# Patient Record
Sex: FEMALE | Race: WHITE | Employment: STUDENT | URBAN - METROPOLITAN AREA
[De-identification: names, ages, dates, MRNs, and addresses within clinical notes are randomized per-mention and may not be internally consistent; named-entity substitution may affect disease eponyms.]

---

## 2019-05-10 ENCOUNTER — HOSPITAL ENCOUNTER (OUTPATIENT)
Dept: PHYSICAL THERAPY | Age: 19
Setting detail: THERAPIES SERIES
Discharge: HOME | End: 2019-05-10
Payer: COMMERCIAL

## 2019-05-10 PROCEDURE — 97140 MANUAL THERAPY 1/> REGIONS: CPT

## 2019-05-10 PROCEDURE — 97110 THERAPEUTIC EXERCISES: CPT

## 2019-05-10 PROCEDURE — 97161 PT EVAL LOW COMPLEX 20 MIN: CPT

## 2019-05-10 NOTE — PLAN OF CARE
Orthopaedics and [address] [address] [facility] [address]  [place] [phone]  [place] [facility] [phone]     Physical Therapy Certification    Dear Referring Practitioner: Dr. Dariel Bañuelos,    We had the pleasure of evaluating the following patient for physical therapy services at [address] [place] [address]. A summary of our findings can be found in the initial assessment below. This includes our plan of care. If you have any questions or concerns regarding these findings, please do not hesitate to contact me at the office phone number checked above. Thank you for the referral.       Physician Signature:_______________________________Date:__________________  By signing above (or electronic signature), therapists plan is approved by physician      Patient: Ashley Serrato   : 2000   MRN: [id number]  Referring Physician: Referring Practitioner: Dr. Dariel Bañuelos      Evaluation Date: 5/10/2019      Medical Diagnosis Information:  Diagnosis: concussion, cervicogenic HA   Treatment Diagnosis: concussion S06.0X0A, cervicogenic HA G44.841, neck pain M54.2                                         Insurance information: PT Insurance Information: Aetna/AG/[place]    Precautions/ Contra-indications: none  Latex Allergy:  [x]NO      []YES  Preferred Language for Healthcare:   [x]English       []other:    SUBJECTIVE: Patient stated complaint: Patient reports that she was at practice last Friday and got hit from the L side and her neck was turned quickly. States that she has been having a HA since that time, no real neck pain, dizziness, light sensitivity, difficulty concentrating, loud noises. Most difficult/annoying is the HA. Notes HA wraps around entire posterior aspect of head.      Relevant Medical History:additional hx of concussion this past fall  Functional Disability Index:  NDI 28% disability, DHI 8% disability    Pain Scale: 8/10, typically has been at 6/10  Easing factors: not doing anything  Provocative factors: studying, sport     Type: [x]Constant   []Intermittent  []Radiating []Localized []other:     Numbness/Tingling: none    Occupation/School: student at [employer] Level of Function: Independent with ADLs and IADLs, plays field hockey    OBJECTIVE:     Special Test Results Symptoms and Time until Resolution of Symptoms   Dynamic Gait Index     Functional Gait Assessment     Motion Provoked Dizziness      Nel-Hallpike     Roll Test     Sidelying Test     Dizziness Handicap Inventory 8% disability    The Activities-Specific Balance Confidence Scale     BERTHA 2 errors level/ 14 errors uneven Increased dizziness                 VOMS Not Tested Headache 0-10 Dizziness 0-10 Nausea 0-10 Fogginess 0-10 Comments   Baseline Symptoms:         Smooth Pursuits  8.5/10 5/10 0/10 6/10 Mild catch in eyes with moving L   Saccades-Horizontal    8.5/10 0/10 0/10 6/10    Saccades-Vertical  8.5/10 0/10 0/10 6/10    Convergence (near Point)  9/10 6/10 0/10 6/10 Near Point in cm  Measure 1:20cm  Measure 2:20cm  Measure 3:   VOR-Horizontal         VOR-Vertical         Visual Motion Sensitivity                        CERV ROM     Cervical Flexion 55    Cervical Extension 60    Cervical SB 45 40   Cervical rotation Mild limitation Mild limitation- HA increase with R rotation        ROM Left Right   Shoulder Flex     Shoulder Abd     Shoulder ER     Shoulder IR                    Cervical Strength Right  Left   Flexion     Extension     Rotation     Lateral Flexion               Middle trap     Lower trap     Rhomboids             Special Test Right  Left   Spurlings     Sharp Pursor     VBI      Joint Mobility WNL Limited throughout entire L cervical spine   General Myotomes testing (listed deficits)                   Reflexes Normal Abnormal Comments         S1-2 Seated achilles [] []    S1-2 Prone knee bend [] []    L3-4 Patellar tendon [] []    C5-6 Biceps [] []    C6 Brachioradialis [] []    C7-8 Triceps [] []    Clonus [] []    Babinski [] []    Newsome's [] []      Reflexes/Sensation:    [x]Dermatomes/Myotomes intact    [x]Reflexes equal and normal bilaterally   []Other:    Joint mobility: limited L cervical mobility with sidegliding and upgliding from C4-C7   []Normal    [x]Hypo   []Hyper    Palpation: pain with palpation to posterior cervical musculature C4-7- no pain with suboccipitals, SCM or 1st rib    Functional Mobility/Transfers: limited in concentrating,reading/studing as well as participation in sport    Posture: WNL    Bandages/Dressings/Incisions: NAnone    Gait: (include devices/WB status): WNL     Orthopedic Special Tests: n/a                       [x] Patient history, allergies, meds reviewed. Medical chart reviewed. See intake form. Review Of Systems (ROS):  [x]Performed Review of systems (Integumentary, CardioPulmonary, Neurological) by intake and observation. Intake form has been scanned into medical record. Patient has been instructed to contact their primary care physician regarding ROS issues if not already being addressed at this time.       Co-morbidities/Complexities (which will affect course of rehabilitation):   [x]None           Arthritic conditions   []Rheumatoid arthritis (M05.9)  []Osteoarthritis (M19.91)   Cardiovascular conditions   []Hypertension (I10)  []Hyperlipidemia (E78.5)  []Angina pectoris (I20)  []Atherosclerosis (I70)  []CVA Musculoskeletal conditions   []Disc pathology   []Congenital spine pathologies   []Prior surgical intervention  []Osteoporosis (M81.8)  []Osteopenia (M85.8)   Endocrine conditions   []Hypothyroid (E03.9)  []Hyperthyroid Gastrointestinal conditions   []Constipation (S55.81)   Metabolic conditions   []Morbid obesity (E66.01)  []Diabetes type 1(E10.65) or 2 (E11.65)   []Neuropathy (G60.9)     Pulmonary conditions   []Asthma (J45)  []Coughing   []COPD (J44.9)   Psychological Disorders  []Anxiety RC/scapular/core strength and neuromuscular control    []Decreased UE functional strength   []other:      Functional Activity Limitations (from functional questionnaire and intake)   [x]Reduced ability to tolerate prolonged functional positions   []Reduced ability or difficulty with changes of positions or transfers between positions   []Reduced ability to maintain good posture and demonstrate good body mechanics with sitting, bending, and lifting   [x] Reduced ability or tolerance with driving and/or computer work   []Reduced ability to perform lifting, reaching, carrying tasks   [x]Reduced ability to concentrate   []Reduced ability to sleep    []Reduced ability to tolerate any impact through UE or spine   []Reduced ability to ambulate prolonged functional periods/distances   []other:    Participation Restrictions   []Reduced participation in self care activities   []Reduced participation in home management activities   [x]Reduced participation in work/school activities   []Reduced participation in social activities. [x]Reduced participation in sport/recreational activities.     Classification/Subgrouping:   []signs/symptoms consistent with neck pain with mobility deficits     [x]signs/symptoms consistent with neck pain with movement coordinated impairments    []signs/symptoms consistent with neck pain with radiating pain    [x]signs/symptoms consistent with neck pain with headaches (cervicogenic)    []Signs/symptoms consistent with nerve root involvement including myotome & dermatome dysfunction   []sign/symptoms consistent with facet dysfunction of cervical and thoracic spine    []signs/symptoms consistent suggesting central cord compression/UMN syndromes   []signs/symptoms consistent with discogenic cervical pain   []signs/symptoms consistent with rib dysfunction   []signs/symptoms consistent with postural dysfunction   []signs/symptoms consistent with shoulder pathology    []signs/symptoms consistent with post-surgical status including decreased ROM, strength and function. []signs/symptoms consistent with pathology which may benefit from Dry Needling   []signs/symptoms which may limit the use of advanced manual therapy techniques: (Elevated CV risk profile, recent trauma, intolerance to end range positions, prior TIA, visual issues, UE neurological compromise )     Prognosis/Rehab Potential:      []Excellent   [x]Good    []Fair   []Poor    Tolerance of evaluation/treatment:    []Excellent   [x]Good    []Fair   []Poor    Physical Therapy Evaluation Complexity Justification  [x] A history of present problem with:  [x] no personal factors and/or comorbidities that impact the plan of care;  []1-2 personal factors and/or comorbidities that impact the plan of care  []3 personal factors and/or comorbidities that impact the plan of care  [x] An examination of body systems using standardized tests and measures addressing any of the following: body structures and functions (impairments), activity limitations, and/or participation restrictions;:  [x] a total of 1-2 or more elements   [] a total of 3 or more elements   [] a total of 4 or more elements   [x] A clinical presentation with:  [x] stable and/or uncomplicated characteristics   [] evolving clinical presentation with changing characteristics  [] unstable and unpredictable characteristics;   [x] Clinical decision making of [x] low, [] moderate, [] high complexity using standardized patient assessment instrument and/or measurable assessment of functional outcome. [x] EVAL (LOW) 71139 (typically 20 minutes face-to-face)  [] EVAL (MOD) 32052 (typically 30 minutes face-to-face)  [] EVAL (HIGH) 82433 (typically 45 minutes face-to-face)  [] RE-EVAL     PLAN:   Frequency/Duration:  2 days per week for 1 Weeks:  Interventions:  [x]  Therapeutic exercise including: strength training, ROM, for cervical spine,scapula, core and Upper extremity, including postural re-education. [x]  NMR activation and proprioception for Deep cervical flexors, periscapular and RC muscles and Core, including postural re-education. [x]  Manual therapy as indicated for C/T spine, ribs, Soft tissue to include: Dry Needling/IASTM, STM, PROM, Gr I-IV mobilizations, manipulation. [x] Modalities as needed that may include: thermal agents, E-stim, Biofeedback, US, iontophoresis as indicated  [x] Patient education on joint protection, postural re-education, activity modification, progression of HEP. HEP instruction: oculomotor exercises and chin tuck (see scanned forms)    GOALS:  Patient stated goal: reduce HA    Therapist goals for Patient:   Short Term Goals: To be achieved in: 1 weeks  1. Independent in HEP and progression per patient tolerance, in order to prevent re-injury. 2. Patient will have a decrease in pain to facilitate improvement in movement, function, and ADLs as indicated by Functional Deficits. 3. Disability index score of 20% or less for the NDI to assist with reaching prior level of function. 4. Patient will report HA no greater than 4-5/10 with studying/concentrating.           Electronically signed by:  Elvia William

## 2019-05-10 NOTE — FLOWSHEET NOTE
[place] [address] [facility]  Phone: [phone] Fax: [phone]    Physical Therapy Daily Treatment Note  Date:  5/10/2019    Patient Name:  Lee Bains    :  2000  MRN: [id number]  Restrictions/Precautions:    Medical/Treatment Diagnosis Information:  · Diagnosis: concussion, cervicogenic HA  · Treatment Diagnosis: concussion S06.0X0A, cervicogenic HA G44.841, neck pain B41.1  Insurance/Certification information:  PT Insurance Information: Aetna/[place]  Physician Information:  Referring Practitioner: Dr. Mariee Cap of care signed (Y/N):     Date of Patient follow up with Physician:     G-Code (if applicable):      Date G-Code Applied: 5/10/2019     NDI 28% disability; [address] 8% disability    Progress Note: [x]  Yes  []  No  Next due by: Visit #10      Latex Allergy:  [x]NO      []YES  Preferred Language for Healthcare:   [x]English       []other:    Visit # Insurance Allowable   1 Aetna/[place]     Pain level:  8/10     SUBJECTIVE:  Patient reports that she was at practice last Friday and got hit from the L side and her neck was turned quickly. States that she has been having a HA since that time, no real neck pain, dizziness, light sensitivity, difficulty concentrating, loud noises. Most difficult/annoying is the HA.  Notes HA wraps around entire posterior aspect of head    OBJECTIVE: See eval  Observation:   Test measurements:      RESTRICTIONS/PRECAUTIONS: none    Exercises/Interventions:   INTERVENTIONS:    Exercise/Equipment Resistance/Repetitions Symptoms and duration for Resolution   Cervical Interventions: 20 min     Stretching            Upper trap            Levator                    Strengthening            Cervical retractions/chin tuck 1 x 15           Rows            Shoulder extensions            Flexion            Extension            Right Rotation            Left Rotation            Right Side Flexion            Left Side Flexion          Vestibular Interventions            Nel-Hallpike            Habituation          Double Leg Standing     Tandem Standing     Single Leg Standing     Biodex     Airex     Rocker Board     BOSU     Balance Beam     BERTHA 5min                   Oculomotor Interventions     Smooth Pursuits X 15    Gaze Stabilization X 15    Saccades-Horizontal X 15    Saccades-Vertical X 15    VOR-Hoirzontal     VOR-Vertical     Convergence     Visual Motion Sensitivity  (VMS)                 Therapeutic Ex Sets/sec Reps Notes   UBE      T- band Row/pinch      T- band lower pinch      T- band ER activation      UT stretch      Levator stretch      Isometric at wall      Quadruped w cerv retract      Front plank      Side plank      Chin tuck      Chin tuck w lift      Chin tuck w rotation                  Manual Intervention: 15 min      Cerv mobs/manip 1 5    Thoracic mobs/manip      CT manip 1 5    Rib mobilizations      STM 1 5    DN            NMR re-education      T-spine Ext- foam roll      Chin tucks       No money      Wall Postural re-ed                      Therapeutic Exercise and NMR EXR  [x] (92818) Provided verbal/tactile cueing for activities related to strengthening, flexibility, endurance, ROM  for improvements in cervical, postural, scapular, scapulothoracic and UE control with self care, reaching, carrying, lifting, house/yardwork, driving/computer work.    [] (45786) Provided verbal/tactile cueing for activities related to improving balance, coordination, kinesthetic sense, posture, motor skill, proprioception  to assist with cervical, scapular, scapulothoracic and UE control with self care, reaching, carrying, lifting, house/yardwork, driving/computer work.     Therapeutic Activities:    [] (75483 or 94802) Provided verbal/tactile cueing for activities related to improving balance, coordination, kinesthetic sense, posture, motor skill, proprioception and motor activation pain to facilitate improvement in movement, function, and ADLs as indicated by Functional Deficits. 3. Disability index score of 20% or less for the NDI to assist with reaching prior level of function. 4. Patient will report HA no greater than 4-5/10 with studying/concentrating. Progression Towards Functional goals:  [] Patient is progressing as expected towards functional goals listed. [] Progression is slowed due to complexities listed. [] Progression has been slowed due to co-morbidities. [x] Plan just implemented, too soon to assess goals progression  [] Other:     ASSESSMENT:  See saskia, mild improvement in HA at conclusion. Mild improvement in L cervical joint mobility. Cavitation of CT junction in seated.  Instructed all oculomotor exercises to include smooth pursuits, saccades and gaze stabilization    Treatment/Activity Tolerance:  [x] Patient tolerated treatment well [] Patient limited by fatique  [] Patient limited by pain  [] Patient limited by other medical complications  [] Other:     Prognosis: [x] Good [] Fair  [] Poor    Patient Requires Follow-up: [x] Yes  [] No    PLAN: See saskia  [] Continue per plan of care [] Alter current plan (see comments)  [x] Plan of care initiated [] Hold pending MD visit [] Discharge    Electronically signed by: Andressa Corral

## 2019-05-13 ENCOUNTER — HOSPITAL ENCOUNTER (OUTPATIENT)
Dept: PHYSICAL THERAPY | Age: 19
Setting detail: THERAPIES SERIES
Discharge: HOME | End: 2019-05-13
Payer: COMMERCIAL

## 2019-05-13 PROCEDURE — 97140 MANUAL THERAPY 1/> REGIONS: CPT

## 2019-05-13 PROCEDURE — 97110 THERAPEUTIC EXERCISES: CPT

## 2019-05-13 NOTE — FLOWSHEET NOTE
[facility] [address] [facility]  Phone: [phone] Fax: [phone]    Physical Therapy Daily Treatment Note  Date:  2019    Patient Name:  Radha Rosa    :  2000  MRN: [id number]  Restrictions/Precautions:    Medical/Treatment Diagnosis Information:  · Diagnosis: concussion, cervicogenic HA  · Treatment Diagnosis: concussion S06.0X0A, cervicogenic HA G44.841, neck pain U06.4  Insurance/Certification information:  PT Insurance Information: Aetna/[place]  Physician Information:  Referring Practitioner: Dr. Shade Sr of care signed (Y/N):     Date of Patient follow up with Physician:     G-Code (if applicable):      Date G-Code Applied: 5/10/2019     NDI 28% disability; [address] 8% disability    Progress Note: [x]  Yes  []  No  Next due by: Visit #10      Latex Allergy:  [x]NO      []YES  Preferred Language for Healthcare:   [x]English       []other:    Visit # Insurance Allowable   2 Aetna/AG/[place]     Pain level:  3/10     SUBJECTIVE:  Patient reports that she has been feeling quite a bit better since last Friday and throughout the entire weekend. Only at 3/10 HA today. Overall states she is doing much better. 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      OBJECTIVE:   Observation:   Test measurements:      RESTRICTIONS/PRECAUTIONS: none    Exercises/Interventions:   INTERVENTIONS:    Exercise/Equipment Resistance/Repetitions Symptoms and duration for Resolution   Cervical Interventions: 28 min     Stretching            Upper trap            Levator                    Strengthening            Cervical retractions/chin tuck 10sec x 10 22mmHg- cervical cuff   Quadruped chin tuck + retraction 10sec x 10           Rows            Shoulder extensions            Flexion            Extension            Right Rotation            Left Rotation            Right Side Flexion Left Side Flexion          Vestibular Interventions            Central-Hallpike            Habituation          Double Leg Standing     Tandem Standing     Single Leg Standing     Biodex 9 min; Level 9 , 3 trials each 1. Random Control- large [place]-med speed (99%, 89%, 83%)  2. Maze Control- (67%, 96%, 64%)  3.  Limits of Stability (33%, 50%, 46%)   Airex     Rocker Board     BOSU     Balance Beam     BERTHA                    Oculomotor Interventions     Smooth Pursuits X 20 each Horizontal, vertical and diagonal   Gaze Stabilization X 20 each horizontal & vertical   Saccades-Horizontal X 20 each    Saccades-Vertical X 20 each And diagonal   VOR-Hoirzontal     VOR-Vertical     Convergence     Visual Motion Sensitivity  (VMS)                 Therapeutic Ex Sets/sec Reps Notes   UBE      T- band Row/pinch      T- band lower pinch      T- band ER activation      UT stretch      Levator stretch      Isometric at wall      Quadruped w cerv retract      Front plank      Side plank      Chin tuck      Chin tuck w lift      Chin tuck w rotation                  Manual Intervention: 22 min      Cerv mobs/manip 1 10 Prone and supine   Thoracic mobs/manip      CT manip 1 5    Rib mobilizations 1 2 1st rib -L   STM 1 5    DN            NMR re-education      T-spine Ext- foam roll      Chin tucks       No money      Wall Postural re-ed                      Therapeutic Exercise and NMR EXR  [x] (67721) Provided verbal/tactile cueing for activities related to strengthening, flexibility, endurance, ROM  for improvements in cervical, postural, scapular, scapulothoracic and UE control with self care, reaching, carrying, lifting, house/yardwork, driving/computer work.    [] (11763) Provided verbal/tactile cueing for activities related to improving balance, coordination, kinesthetic sense, posture, motor skill, proprioception  to assist with cervical, scapular, scapulothoracic and UE control with self care, reaching, carrying, lifting, house/yardwork, driving/computer work. Therapeutic Activities:    [] (40880 or 67260) Provided verbal/tactile cueing for activities related to improving balance, coordination, kinesthetic sense, posture, motor skill, proprioception and motor activation to allow for proper function of cervical, scapular, scapulothoracic and UE control with self care, carrying, lifting, driving/computer work.      Home Exercise Program:    [x] (45653) Reviewed/Progressed HEP activities related to strengthening, flexibility, endurance, ROM of cervical, scapular, scapulothoracic and UE control with self care, reaching, carrying, lifting, house/yardwork, driving/computer work  [] (58834) Reviewed/Progressed HEP activities related to improving balance, coordination, kinesthetic sense, posture, motor skill, proprioception of cervical, scapular, scapulothoracic and UE control with self care, reaching, carrying, lifting, house/yardwork, driving/computer work      Manual Treatments:  PROM / STM / Oscillations-Mobs:  G-I, II, III, IV (PA's, Inf., Post.)  [x] (84827) Provided manual therapy to mobilize soft tissue/joints of cervical/CT, scapular GHJ and UE for the purpose of decreasing headache, modulating pain, promoting relaxation,  increasing ROM, reducing/eliminating soft tissue swelling/inflammation/restriction, improving soft tissue extensibility and allowing for proper ROM for normal function with self care, reaching, carrying, lifting, house/yardwork, driving/computer work    Modalities:      Charges:  Timed Code Treatment Minutes: 50   Total Treatment Minutes: 51     [] EVAL (LOW) 93791 (typically 20 minutes face-to-face)  [] EVAL (MOD) 95523 (typically 30 minutes face-to-face)  [] EVAL (HIGH) 55273 (typically 45 minutes face-to-face)  [] RE-EVAL     [x] UX(23496) x  2   [] IONTO  [] NMR (80261) x      [] VASO  [x] Manual (77370) x  1    [] Other:  [] TA x       [] Mech Traction (11241)  [] ES(attended) (88614)      [] ES (un) (07179):     GOALS:  Patient stated goal: reduce HA    Therapist goals for Patient:   Short Term Goals: To be achieved in: 1 weeks  1. Independent in HEP and progression per patient tolerance, in order to prevent re-injury. 2. Patient will have a decrease in pain to facilitate improvement in movement, function, and ADLs as indicated by Functional Deficits. 3. Disability index score of 20% or less for the NDI to assist with reaching prior level of function. 4. Patient will report HA no greater than 4-5/10 with studying/concentrating. Progression Towards Functional goals:  [x] Patient is progressing as expected towards functional goals listed. [] Progression is slowed due to complexities listed. [] Progression has been slowed due to co-morbidities. [] Plan just implemented, too soon to assess goals progression  [] Other:     ASSESSMENT:  Patient with less cervical joint limitation today. Restricted mainly at CT junction and L C5. Patient with improved joint restriction at conclusion of session- however no real reports of improvement in HA. Cavitation of CT junction in seated. Instructed all oculomotor exercises to include smooth pursuits, saccades and gaze stabilization- added diagonal exercises and Biodex. Will only be able to see patient 1 additional visit before she leaves to return home. Treatment/Activity Tolerance:  [x] Patient tolerated treatment well [] Patient limited by fatique  [] Patient limited by pain  [] Patient limited by other medical complications  [] Other:     Prognosis: [x] Good [] Fair  [] Poor    Patient Requires Follow-up: [x] Yes  [] No    PLAN: Continue with POC, progress as tolerated.    [x] Continue per plan of care [] Alter current plan (see comments)  [] Plan of care initiated [] Hold pending MD visit [] Discharge    Electronically signed by: Carl Baltazar

## 2019-05-15 ENCOUNTER — HOSPITAL ENCOUNTER (OUTPATIENT)
Dept: PHYSICAL THERAPY | Age: 19
Setting detail: THERAPIES SERIES
Discharge: HOME | End: 2019-05-15
Payer: COMMERCIAL

## 2019-05-15 PROCEDURE — 97110 THERAPEUTIC EXERCISES: CPT

## 2019-05-15 PROCEDURE — 97140 MANUAL THERAPY 1/> REGIONS: CPT

## 2019-05-15 NOTE — PLAN OF CARE
[place] [address] [address]  Phone: [phone] Fax: [phone]     Physical Therapy Discharge Summary    Dear Referring Practitioner: Dr. Ramirez Goodman,    We had the pleasure of treating the following patient for physical therapy services at [address] [place] [address]. A summary of our findings can be found in the discharge summary below. If you have any questions or concerns regarding these findings, please do not hesitate to contact me at the office phone number above. Thank you for the referral.     Physician Signature:________________________________Date:__________________  By signing above (or electronic signature), therapists plan is approved by physician      Overall Response to Treatment:   [x]Patient is responding well to treatment and improvement is noted with regards  to goals   []Patient should continue to improve in reasonable time if they continue HEP   []Patient has plateaued and is no longer responding to skilled PT intervention    []Patient is getting worse and would benefit from return to referring MD   []Patient unable to adhere to initial POC   [x]Other: Patient is leaving to return home to the [place] this week, therefore will d/c from PT services here. Is planning on continuing with PT services at home.     Date range of Visits:  thru 5/15/2019  Total Visits: 3    Physical Therapy Daily Treatment Note  Date:  5/15/2019    Patient Name:  Praful Rogers    :  2000  MRN: [id number]  Restrictions/Precautions:    Medical/Treatment Diagnosis Information:  · Diagnosis: concussion, cervicogenic HA  · Treatment Diagnosis: concussion S06.0X0A, cervicogenic HA G44.841, neck pain H76.2  Insurance/Certification information:  PT Insurance Information: Aetna/AG/[place]  Physician Information:  Referring Practitioner: Dr. Ugo Zavala of care signed (Y/N):     Date of Patient follow up with Physician:     G-Code (if applicable):      Date G-Code Applied: 5/15/2019     NDI 10% disability; [address] 2% disability    Progress Note: [x]  Yes  []  No  Next due by: Visit #10      Latex Allergy:  [x]NO      []YES  Preferred Language for Healthcare:   [x]English       []other:    Visit # Insurance Allowable   3 Aetna/AG/[place]     Pain level:  5/10     SUBJECTIVE:  Patient reports that she felt really good after last session- did not have any HA for the rest of the day and into the next. States that she woke up with more of a HA today than she has been having. Doing well with oculomotor exercises. Will be leaving for home this week. Will be continuing with PT services at home. Overall feels 70% improved since onset of therapy last week. OBJECTIVE:   Observation:   Test measurements:      RESTRICTIONS/PRECAUTIONS: none     Special Test Results Symptoms and Time until Resolution of Symptoms   Dizziness Handicap Inventory 2% disability     BERTHA 2 errors level/ 6 errors uneven Mild dizziness with EC         VOMS Not Tested Headache 0-10 Dizziness 0-10 Nausea 0-10 Fogginess 0-10 Comments   Baseline Symptoms:    4/10           Smooth Pursuits  4/10 0/10 0/10 0/10 Mild catch in eyes with moving L   Saccades-Horizontal      4/10 0/10 0/10 0/10     Saccades-Vertical   4/10 0/10 0/10 0/10     Convergence (near Point)   4/10 0/10 0/10 0/10 3cm        CERV ROM       Cervical Flexion 65     Cervical Extension 70     Cervical SB 55 55   Cervical rotation WNL WNL       Exercises/Interventions:   INTERVENTIONS:    Exercise/Equipment Resistance/Repetitions Symptoms and duration for Resolution   Cervical Interventions: 28 min     Strengthening            Cervical retractions/chin tuck 10sec x 10 22mmHg- cervical cuff   Quadruped chin tuck + retraction 10sec x 10    Double Leg Standing     Tandem Standing     Single Leg Standing     Biodex 9 min; Level 9 , 3 trials each 1. Random Control- large [place]-med speed (90%, 85%, 83%)  2. Maze Control- (75%, 82%, 84%)  3. Limits of Stability (35%, 56%, 48%)   Balance Beam     BERTHA 3 min    Oculomotor Interventions     Smooth Pursuits X 20 each Horizontal, vertical and diagonal   Gaze Stabilization X 20 each Standing red ball toss, walking with head turns x 2 reps each vertical and horizontal   Saccades-Horizontal     Saccades-Vertical       Manual Intervention: 22 min      Cerv mobs/manip 1 10 Prone and supine (AO mobilization   Thoracic mobs/manip 1 3 Prone t spine   CT manip 1 3 seated   Rib mobilizations np  1st rib -L   STM 1 5 Suboccipital release   DN          Therapeutic Exercise and NMR EXR  [x] (05641) Provided verbal/tactile cueing for activities related to strengthening, flexibility, endurance, ROM  for improvements in cervical, postural, scapular, scapulothoracic and UE control with self care, reaching, carrying, lifting, house/yardwork, driving/computer work.    [] (39512) Provided verbal/tactile cueing for activities related to improving balance, coordination, kinesthetic sense, posture, motor skill, proprioception  to assist with cervical, scapular, scapulothoracic and UE control with self care, reaching, carrying, lifting, house/yardwork, driving/computer work. Therapeutic Activities:    [] (75912 or 87547) Provided verbal/tactile cueing for activities related to improving balance, coordination, kinesthetic sense, posture, motor skill, proprioception and motor activation to allow for proper function of cervical, scapular, scapulothoracic and UE control with self care, carrying, lifting, driving/computer work.      Home Exercise Program:    [x] (26291) Reviewed/Progressed HEP activities related to strengthening, flexibility, endurance, ROM of cervical, scapular, scapulothoracic and UE control with self care, reaching, carrying, lifting, house/yardwork, driving/computer work  [] (61255) Reviewed/Progressed HEP activities related to improving balance, coordination, kinesthetic sense, been slowed due to co-morbidities. [] Plan just implemented, too soon to assess goals progression  [] Other:     ASSESSMENT:  Patient has been seen for 3 visits of physical therapy to address cervical neck pain and HA associated with concussion. Patient has made good improvements within 3 visits- HA has been reduced to average of 3/10; however today minimal increase with studying to 5/10 and improved back to 3/10 at conclusion of session with manual therapies. Patient NDI improved from 28% disability to 10% disability, DHI improved from 8% to 2% disability and BERTHA improved from 16 errors to 8 errors today. Patient still with mild limitation in oculomotor coordination during horizontal smooth pursuits. Did well with progression to standing/walking gaze stabilization and smooth pursuits today. Overall s/s have improved considerably. Less restriction along cervical spine today; however decreased AO mobility and CT junction mobility today. Improved with manual therapies. Reviewed HEP with patient. As patient to return home this week, will d/c from PT services at this time. Patient educated to continue with HEP and to follow up with PT services at home. Treatment/Activity Tolerance:  [x] Patient tolerated treatment well [] Patient limited by fatique  [] Patient limited by pain  [] Patient limited by other medical complications  [] Other:     Prognosis: [x] Good [] Fair  [] Poor    Patient Requires Follow-up: [x] Yes  [] No    PLAN: D/c from skilled PT services, educated to continue with HEP to prevent re-injury. Patient will need to continue with PT services at home for further progression.    [] Continue per plan of care [] Alter current plan (see comments)  [] Plan of care initiated [] Hold pending MD visit [x] Discharge    Electronically signed by: Bob Leon

## 2019-08-13 ENCOUNTER — NURSE ONLY (OUTPATIENT)
Dept: CARDIOLOGY CLINIC | Age: 19
End: 2019-08-13
Payer: COMMERCIAL

## 2019-08-13 DIAGNOSIS — Z02.5 SPORTS PHYSICAL: Primary | ICD-10-CM

## 2019-08-13 PROCEDURE — 93000 ELECTROCARDIOGRAM COMPLETE: CPT | Performed by: INTERNAL MEDICINE

## 2019-08-19 ENCOUNTER — OFFICE VISIT (OUTPATIENT)
Dept: PRIMARY CARE CLINIC | Age: 19
End: 2019-08-19
Payer: COMMERCIAL

## 2019-08-19 VITALS
HEART RATE: 64 BPM | OXYGEN SATURATION: 97 % | WEIGHT: 130.2 LBS | HEIGHT: 69 IN | SYSTOLIC BLOOD PRESSURE: 110 MMHG | DIASTOLIC BLOOD PRESSURE: 72 MMHG | BODY MASS INDEX: 19.28 KG/M2

## 2019-08-19 DIAGNOSIS — S06.0X0A CONCUSSION WITHOUT LOSS OF CONSCIOUSNESS, INITIAL ENCOUNTER: Primary | ICD-10-CM

## 2019-08-19 PROCEDURE — 99212 OFFICE O/P EST SF 10 MIN: CPT | Performed by: INTERNAL MEDICINE

## 2019-08-19 NOTE — PROGRESS NOTES
HISTORY OF PRESENT ILLNESS   Thais Mercado is a 22 y.o. right hand dominant female who presents for initial evaluation of a concussion suffered on 8/19/19. Pt reports she collided with another athlete's shoulder yesterday during warm ups. She practiced, then nausea worsened when tried to sleep. She reports a mild HA in practice. Her HA worsened when she stopped practicing. Appetite was slightly worse last night, but she slept well- went to bed earlier, slept through night, felt better than yesterday overall. Reports her HA is less intense, no dizziness, no nausea. Breakfast went OK. Mild light sensitivity, no phonophobia. Feels slow. HA worsens with looking at phone. The patient attends: [employer], grade 14  Sport:   Position: [place]   Other sports played: None  Previous concussion?: 3   Mechanism of injury: As above  Loss of consciousness: no. If yes, how long: NA   Helmet: no   Mouthpiece: yes   Initial treatment has included rest.   Current school attendance: NA.       RELEVANT MEDICAL HISTORY   ADHD: no   Seizure disorder: no   Learning disorder: no   Meningitis: no   Migraine or HA: no   Hx depression/anxiety/bipolar/schizophrenia: no   Family Hx migraines: no   FHx depression/anxiety/bipolar/schizophrenia: no         Current Symptoms include: see attached flowsheet        ]   ]   ]   ]   ]   ]   ]   ]   ]      PHYSICAL EXAMINATION   Constitutional: Alert, no distress, answers questions appropriately   Neuro: CN II - XII intact, strength 5/5 in bilateral UE, strength 5/5 in bilateral LE, moves all extremities equally. Symmetric reflexes bilateral UE and LE   Head: Normocephalic, no point tenderness to palpation. No sinus tenderness   Ears: Hearing grossly intact   Eyes: Extra ocular movements intact. Normal conjugate gaze, normal tracking, no nystagmus. Normal finger to nose. Discomfort with rapid eye movements, convergence.   Nasal: No tenderness, or bruising   Balance: Normal single leg stance with eyes open and closed. Negative Romberg. Negative pronator drift. Normal tandem gait with eyes open and closed   C-spine Exam: TTP along left paracervical mm  ROM Flexion: NL   ROM Extension: NL   ROM Lateral rotation (bilateral): NL   ROM Lateral Bend (bilateral): NL   Pain to palpation in periscapular region: negative   Shoulder shrug NL   Spurling's maneuver: Negative     Cognitive Testing Errors   Orientation score: 0/5. Immediate memory score: 0/3. Concentration score: 0/3. Delayed recall score: 0/3. Coordination score: 0/6. Which arm tested: B/L    Balance Testing   Modified Renata   Which foot was tested (non-dominant): B/L  Double Leg Stance (feet together): 0/10   Single leg stance (non-dominant foot): 0/10   Tandem stance (non-dominant foot at back): 2/10   Balance examination score (30-errors): 28/30     Renata Test: Date: 8/19/19  Total Errors Firm: 2     Neurocognitive Testing   Baseline neurocognitive testing performed: no   ImPACT Scores Date: 2018    IMAGING   None     ASSESSMENT/PLAN   - Refer for Impact testing. Vestibular Rehab. [facility] protocol for concussion. Avoid screens. Discussed the above with the patient:  - The nature of concussion injuries, risk factors, including the risks of repetitive head injury and the risk of long-term sequelae. - Red flags and concerns that would prompt immediate emergency evaluation.   - Appropriate school attendance, academic accommodations (if necessary) based on symptoms, and extra curricular activities. - The importance of physical and cognitive rest: No cell phones, Internet, video games, or driving.   - The role of medication: may use tylenol for HA if needed. Avoid alcohol, sleep aids, pain medications. - Avoid all activities that worsen symptoms.   - Report worsening symptoms.   - Reviewed role of further imaging and neurocognitive testing, may consider based on progress.    - Discussed role of close follow-up, milestones for progression.   - All patient's questions were answered.

## 2019-08-20 ENCOUNTER — HOSPITAL ENCOUNTER (OUTPATIENT)
Dept: PHYSICAL THERAPY | Age: 19
Setting detail: THERAPIES SERIES
Discharge: HOME | End: 2019-08-20
Payer: COMMERCIAL

## 2019-08-20 PROCEDURE — 97140 MANUAL THERAPY 1/> REGIONS: CPT

## 2019-08-20 PROCEDURE — 97161 PT EVAL LOW COMPLEX 20 MIN: CPT

## 2019-08-20 NOTE — FLOWSHEET NOTE
relaxation,  increasing ROM, reducing/eliminating soft tissue swelling/inflammation/restriction, improving soft tissue extensibility and allowing for proper ROM for normal function with self care, reaching, carrying, lifting, house/yardwork, driving/computer work    Modalities:      Charges:  Timed Code Treatment Minutes: 22   Total Treatment Minutes: 50     [x] EVAL (LOW) 30678 (typically 20 minutes face-to-face)  [] EVAL (MOD) 22972 (typically 30 minutes face-to-face)  [] EVAL (HIGH) 98899 (typically 45 minutes face-to-face)  [] RE-EVAL     [] NM(71807) x      [] IONTO  [] NMR (39945) x      [] VASO  [x] Manual (39595) x  1    [] Other:  [] TA x       [] Mech Traction (93517)  [] ES(attended) (72890)      [] ES (un) (43018):     GOALS:  Patient stated goal: get rid of HA  [] Progressing: [] Met: [] Not Met: [] Adjusted    Therapist goals for Patient:   Short Term Goals: To be achieved in: 2 weeks  1. Independent in HEP and progression per patient tolerance, in order to prevent re-injury. [] Progressing: [] Met: [] Not Met: [] Adjusted  2. Patient will have a decrease in pain to facilitate improvement in movement, function, and ADLs as indicated by Functional Deficits. [] Progressing: [] Met: [] Not Met: [] Adjusted    Long Term Goals: To be achieved in: 4 weeks  1. Disability index score of 10% or less for the NDI to assist with reaching prior level of function. [] Progressing: [] Met: [] Not Met: [] Adjusted  2. Patient will demonstrate increased AROM to [facility] of cervical/thoracic spine to allow for proper joint functioning as indicated by patients Functional Deficits. [] Progressing: [] Met: [] Not Met: [] Adjusted  3. Patient will demonstrate an increase in postural awareness and control and activation of  Deep cervical stabilizers to allow for proper functional mobility as indicated by patients Functional Deficits. [] Progressing: [] Met: [] Not Met: [] Adjusted  4.  Patient will return to field hockey activities without increased symptoms or restriction. [] Progressing: [] Met: [] Not Met: [] Adjusted  5. Patient will report being able to concentrate without increased symptoms or restriction (patient specific functional goal)    [] Progressing: [] Met: [] Not Met: [] Adjusted     Progression Towards Functional goals:  [] Patient is progressing as expected towards functional goals listed. [] Progression is slowed due to complexities listed. [] Progression has been slowed due to co-morbidities.   [x] Plan just implemented, too soon to assess goals progression  [] Other:     ASSESSMENT:  See eval    Return to Play: (if applicable)   []  Stage 1: Intro to Strength   []  Stage 2: Dynamic Strength and Intro to Plyometrics   []  Stage 3: Advanced Plyometrics and Intro to Throwing   []  Stage 4: Sport specific Training/Return to Sport     []  Ready to Return to Play, [employer]   []  Not Ready for Return to Sports   Comments:      Treatment/Activity Tolerance:  [x] Patient tolerated treatment well [] Patient limited by fatique  [] Patient limited by pain  [] Patient limited by other medical complications  [] Other:     Prognosis: [x] Good [] Fair  [] Poor    Patient Requires Follow-up: [x] Yes  [] No    PLAN: See eval  [] Continue per plan of care [] Alter current plan (see comments)  [x] Plan of care initiated [] Hold pending MD visit [] Discharge    Electronically signed by: Raquel Serna

## 2019-08-20 NOTE — PLAN OF CARE
Orthopaedics and [address] [address] [facility] [address]  [place] [phone]   [phone]     Physical Therapy Certification    Dear Referring Practitioner: Dr. Isabella Sands,    We had the pleasure of evaluating the following patient for physical therapy services at [address] [place] [address]. A summary of our findings can be found in the initial assessment below. This includes our plan of care. If you have any questions or concerns regarding these findings, please do not hesitate to contact me at the office phone number checked above. Thank you for the referral.       Physician Signature:_______________________________Date:__________________  By signing above (or electronic signature), therapists plan is approved by physician      Patient: Arias Ren   : 2000   MRN: [id number]  Referring Physician: Referring Practitioner: Dr. Isabella Sands      Evaluation Date: 2019      Medical Diagnosis Information:  Diagnosis: concussion   Treatment Diagnosis: concussion S06.0X0A                                         Insurance information: PT Insurance Information: Aetna/AG/[place]    Precautions/ Contra-indications: none  Latex Allergy:  [x]NO      []YES  Preferred Language for Healthcare:   [x]English       []other:    SUBJECTIVE: Patient stated complaint: Patient had prior concussion in spring of 2019 which resolved. Was playing a warm up game 2 days ago and collided head to head with another player- hitting in front of head. Denies LOC. States that she is having HA and dizziness. Denies neck pain. Will be doing impact test later today.      Relevant Medical History:prior hx of 2 concussions (2018 and Spring 2019)  Functional Scale/Score: DHI 28% disability    Pain Scale: 7/10  Easing factors: decreasing stimulus  Provocative factors: increased stiumuls     Type: [x]Constant   []Intermittent  []Radiating []Localized []other:     Numbness/Tingling: ASSESSMENT: Patient is a 23 yo female who presents to therapy 2 days s/p concussion. Patient with difficulty performing smooth pursuits, saccadic movements, as well as gaze stabilization- very limited nystagmus noted with smooth pursuits. Cervical ROM WFL except rotation; limited in cervical joint mobility. Patient tolerated light manual therapy today, as well as oculomotor exercises. Will benefit from continued skilled PT services to address deficits. Functional Impairments:     [x]Noted cervical/thoracic/GHJ joint hypomobility   []Noted cervical/thoracic/GHJ joint hypermobility   [x]Decreased cervical/UE functional ROM   [x]Noted Headache pain aggravated by neck movements with/without dizziness   []Abnormal reflexes/sensation/myotomal/dermatomal deficits   [x]Decreased DCF control or ability to hold head up   []Decreased RC/scapular/core strength and neuromuscular control    []Decreased UE functional strength   []other:      Functional Activity Limitations (from functional questionnaire and intake)   [x]Reduced ability to tolerate prolonged functional positions   []Reduced ability or difficulty with changes of positions or transfers between positions   []Reduced ability to maintain good posture and demonstrate good body mechanics with sitting, bending, and lifting   [] Reduced ability or tolerance with driving and/or computer work   []Reduced ability to perform lifting, reaching, carrying tasks   [x]Reduced ability to concentrate   []Reduced ability to sleep    []Reduced ability to tolerate any impact through UE or spine   []Reduced ability to ambulate prolonged functional periods/distances   []other:    Participation Restrictions   []Reduced participation in self care activities   []Reduced participation in home management activities   [x]Reduced participation in work activities   [x]Reduced participation in social activities.    []Reduced participation in sport/recreational

## 2019-08-22 ENCOUNTER — APPOINTMENT (OUTPATIENT)
Dept: PHYSICAL THERAPY | Age: 19
End: 2019-08-22
Payer: COMMERCIAL

## 2019-08-23 ENCOUNTER — HOSPITAL ENCOUNTER (OUTPATIENT)
Dept: PHYSICAL THERAPY | Age: 19
Setting detail: THERAPIES SERIES
Discharge: HOME | End: 2019-08-23
Payer: COMMERCIAL

## 2019-08-23 PROCEDURE — 97140 MANUAL THERAPY 1/> REGIONS: CPT

## 2019-08-23 PROCEDURE — 97110 THERAPEUTIC EXERCISES: CPT

## 2019-08-23 PROCEDURE — 97112 NEUROMUSCULAR REEDUCATION: CPT

## 2019-08-23 NOTE — FLOWSHEET NOTE
function. [] Progressing: [] Met: [] Not Met: [] Adjusted  2. Patient will demonstrate increased AROM to [facility] of cervical/thoracic spine to allow for proper joint functioning as indicated by patients Functional Deficits. [] Progressing: [] Met: [] Not Met: [] Adjusted  3. Patient will demonstrate an increase in postural awareness and control and activation of  Deep cervical stabilizers to allow for proper functional mobility as indicated by patients Functional Deficits. [] Progressing: [] Met: [] Not Met: [] Adjusted  4. Patient will return to field hockey activities without increased symptoms or restriction. [] Progressing: [] Met: [] Not Met: [] Adjusted  5. Patient will report being able to concentrate without increased symptoms or restriction (patient specific functional goal)    [] Progressing: [] Met: [] Not Met: [] Adjusted     Progression Towards Functional goals:  [] Patient is progressing as expected towards functional goals listed. [] Progression is slowed due to complexities listed. [] Progression has been slowed due to co-morbidities. [x] Plan just implemented, too soon to assess goals progression  [] Other:     ASSESSMENT:  Patient with significant limitation in cervical mobility at C4/5 today. Good cavitation at CTJ. Little to no reproduction of symptoms with saccades and gaze stabilization; however did with smooth pursuits. Added BIODEX to program today. May need DN to cervical spine next visit to further loosen c/s.      Return to Play: (if applicable)   []  Stage 1: Intro to Strength   []  Stage 2: Dynamic Strength and Intro to Plyometrics   []  Stage 3: Advanced Plyometrics and Intro to Throwing   []  Stage 4: Sport specific Training/Return to Sport     []  Ready to Return to Play, [employer]   []  Not Ready for Return to Sports   Comments:      Treatment/Activity Tolerance:  [x] Patient tolerated treatment well [] Patient limited by fatique  [] Patient limited by pain  []

## 2019-08-26 ENCOUNTER — HOSPITAL ENCOUNTER (OUTPATIENT)
Dept: PHYSICAL THERAPY | Age: 19
Setting detail: THERAPIES SERIES
Discharge: HOME | End: 2019-08-26
Payer: COMMERCIAL

## 2019-08-26 PROCEDURE — 97140 MANUAL THERAPY 1/> REGIONS: CPT

## 2019-08-26 PROCEDURE — 97110 THERAPEUTIC EXERCISES: CPT

## 2019-08-26 PROCEDURE — 97112 NEUROMUSCULAR REEDUCATION: CPT

## 2019-08-26 NOTE — FLOWSHEET NOTE
[place] [address] [facility]  Phone: [phone] Fax: [phone]      Physical Therapy Daily Treatment Note  Date:  2019    Patient Name:  Matt Chavez    :  2000  MRN: [id number]  Restrictions/Precautions:    Medical/Treatment Diagnosis Information:  · Diagnosis: concussion  · Treatment Diagnosis: concussion Y87.5D0L  Insurance/Certification information:  PT Insurance Information: Aetna/AG/[place]  Physician Information:  Referring Practitioner: Dr. Jeffry Kearns of care signed (Y/N):     Date of Patient follow up with Physician:     G-Code (if applicable):      Date G-Code Applied:  2019   NDI 28% disability    Progress Note: [x]  Yes  []  No  Next due by: Visit #10      Latex Allergy:  [x]NO      []YES  Preferred Language for Healthcare:   [x]English       []other:    Visit # Insurance Allowable   3 Aetna/AG/[place]     Pain level:  5/10     SUBJECTIVE:  Patient reports that she still has HA, otherwise feeling some better. Still difficult to concentrate.      OBJECTIVE:   Observation: decreased L cervical rotation compared to R  Test measurements:      RESTRICTIONS/PRECAUTIONS: n/a    Exercises/Interventions:   Therapeutic Ex (57832): 12 min Sets/sec Reps CUES/Notes   UBE      Pendulum/Ball rolls      Cane AAROM flex/press      3 way Isomet      T- band Row/pinch      T- band lower pinch      T- band ER activation      Supine SA punch      SL ER/SL punch      Prone Rows/ext      Prone HAB/Prone Flex      Seat Table slides/Wall Slides      Seated HH Depression      No Money      Scap Wall Lat touches/wall walks            Standing flex/scap      Gaze stabilization head turns with walking 2 10    Seated cervical rotation self stretch 2 10    Gaze stabilization 1 15 Vert/horiz/diagonal- no symptoms   Chin tuck 5sec 20 yellow   Smooth pursuits 1 15 Vert/horiz/diagonal- mild incr nausea both directions at end   Saccades 1 15 Vert/horiz/diagonal- no symtpoms    Manual Intervention  (36953): 20 min      Shld /GH Mobs      Post Cap mobs      Thoracic/Rib manipualtion      CT MT/Mobs 1 4    PROM MT      Cervical mobs 1 10 L mid cervical   IASTM/STM 1 6 Subocc, paraspinals   NMR re-education (48396): 15 min      T-spine Ext      GH depress/compress      Scap/GH NMR      Body blade      Wall ball roll      Wall Ball bounce      Ball drops      Manjula Scap Bio      Floor Snow angels-sliders      BIODEX 1 15 Level 10: Random control (med [place] and med speed),  limits of stability,  maze- medium    Therapeutic Activity (35772)      UE throwing porgression      Dynamic UE stability      Earthquake Bar      Bodyblade                Therapeutic Exercise and NMR EXR  [x] (34617) Provided verbal/tactile cueing for activities related to strengthening, flexibility, endurance, ROM  for improvements in scapular, scapulothoracic and UE control with self care, reaching, carrying, lifting, house/yardwork, driving/computer work. [x] (93131) Provided verbal/tactile cueing for activities related to improving balance, coordination, kinesthetic sense, posture, motor skill, proprioception  to assist with  scapular, scapulothoracic and UE control with self care, reaching, carrying, lifting, house/yardwork, driving/computer work. Therapeutic Activities:    [] (84010 or 08228) Provided verbal/tactile cueing for activities related to improving balance, coordination, kinesthetic sense, posture, motor skill, proprioception and motor activation to allow for proper function of scapular, scapulothoracic and UE control with self care, carrying, lifting, driving/computer work.      Home Exercise Program:    [x] (13972) Reviewed/Progressed HEP activities related to strengthening, flexibility, endurance, ROM of scapular, scapulothoracic and UE control with self care, reaching, carrying, lifting, house/yardwork, driving/computer work  [] (67818) Reviewed/Progressed demonstrate increased AROM to [facility] of cervical/thoracic spine to allow for proper joint functioning as indicated by patients Functional Deficits. [] Progressing: [] Met: [] Not Met: [] Adjusted  3. Patient will demonstrate an increase in postural awareness and control and activation of  Deep cervical stabilizers to allow for proper functional mobility as indicated by patients Functional Deficits. [] Progressing: [] Met: [] Not Met: [] Adjusted  4. Patient will return to field hockey activities without increased symptoms or restriction. [] Progressing: [] Met: [] Not Met: [] Adjusted  5. Patient will report being able to concentrate without increased symptoms or restriction (patient specific functional goal)    [] Progressing: [] Met: [] Not Met: [] Adjusted     Progression Towards Functional goals:  [x] Patient is progressing as expected towards functional goals listed. [] Progression is slowed due to complexities listed. [] Progression has been slowed due to co-morbidities. [] Plan just implemented, too soon to assess goals progression  [] Other:     ASSESSMENT:  Patient with less cervical restriction today- still limited though at L C4/5. Improved with mobilization and instructed patient in self mobilization stretch today. Helped quite a bit with HA. Patient able to progress oculomotor exercises further today; did gaze stabilization with walking and with ball today. Did well with BIODEX- no increase in s/s.       Return to Play: (if applicable)   []  Stage 1: Intro to Strength   []  Stage 2: Dynamic Strength and Intro to Plyometrics   []  Stage 3: Advanced Plyometrics and Intro to Throwing   []  Stage 4: Sport specific Training/Return to Sport     []  Ready to Return to Play, [employer]   []  Not Ready for Return to Sports   Comments:      Treatment/Activity Tolerance:  [x] Patient tolerated treatment well [] Patient limited by fatique  [] Patient limited by pain  [] Patient limited by other medical complications  [] Other:     Prognosis: [x] Good [] Fair  [] Poor    Patient Requires Follow-up: [x] Yes  [] No    PLAN: 2x week for 4 weeks to address concussion symptoms and cervical spine limitations  [x] Continue per plan of care [] Alter current plan (see comments)  [] Plan of care initiated [] Hold pending MD visit [] Discharge    Electronically signed by: Chintan Lee

## 2019-08-28 ENCOUNTER — HOSPITAL ENCOUNTER (OUTPATIENT)
Dept: PHYSICAL THERAPY | Age: 19
Setting detail: THERAPIES SERIES
Discharge: HOME | End: 2019-08-28
Payer: COMMERCIAL

## 2019-08-28 PROCEDURE — 97112 NEUROMUSCULAR REEDUCATION: CPT

## 2019-08-28 PROCEDURE — 97110 THERAPEUTIC EXERCISES: CPT

## 2019-08-28 PROCEDURE — 97140 MANUAL THERAPY 1/> REGIONS: CPT

## 2019-08-28 NOTE — FLOWSHEET NOTE
and UE control with self care, reaching, carrying, lifting, house/yardwork, driving/computer work  [] (64863) Reviewed/Progressed HEP activities related to improving balance, coordination, kinesthetic sense, posture, motor skill, proprioception of scapular, scapulothoracic and UE control with self care, reaching, carrying, lifting, house/yardwork, driving/computer work      Manual Treatments:  PROM / STM / Oscillations-Mobs:  G-I, II, III, IV (PA's, Inf., Post.)  [] ([id number]) Provided manual therapy to mobilize soft tissue/joints of cervical/CT, scapular GHJ and UE for the purpose of modulating pain, promoting relaxation,  increasing ROM, reducing/eliminating soft tissue swelling/inflammation/restriction, improving soft tissue extensibility and allowing for proper ROM for normal function with self care, reaching, carrying, lifting, house/yardwork, driving/computer work    Modalities:      Charges:  Timed Code Treatment Minutes: 50   Total Treatment Minutes: 51     [] EVAL (LOW) 81140 (typically 20 minutes face-to-face)  [] EVAL (MOD) 61798 (typically 30 minutes face-to-face)  [] EVAL (HIGH) 29555 (typically 45 minutes face-to-face)  [] RE-EVAL     [x] WZ(90507) x  1   [] IONTO  [x] NMR (75357) x  1   [] VASO  [x] Manual ([id number]) x  1    [] Other:  [] TA x       [] Mech Traction (42040)  [] ES(attended) (96895)      [] ES (un) (91565):     GOALS:  Patient stated goal: get rid of HA  [] Progressing: [] Met: [] Not Met: [] Adjusted    Therapist goals for Patient:   Short Term Goals: To be achieved in: 2 weeks  1. Independent in HEP and progression per patient tolerance, in order to prevent re-injury. [] Progressing: [] Met: [] Not Met: [] Adjusted  2. Patient will have a decrease in pain to facilitate improvement in movement, function, and ADLs as indicated by Functional Deficits. [] Progressing: [] Met: [] Not Met: [] Adjusted    Long Term Goals: To be achieved in: 4 weeks  1.  Disability index score of 10% or less for the NDI to assist with reaching prior level of function. [] Progressing: [] Met: [] Not Met: [] Adjusted  2. Patient will demonstrate increased [facility] to [facility] of cervical/thoracic spine to allow for proper joint functioning as indicated by patients Functional Deficits. [] Progressing: [] Met: [] Not Met: [] Adjusted  3. Patient will demonstrate an increase in postural awareness and control and activation of  Deep cervical stabilizers to allow for proper functional mobility as indicated by patients Functional Deficits. [] Progressing: [] Met: [] Not Met: [] Adjusted  4. Patient will return to field hockey activities without increased symptoms or restriction. [] Progressing: [] Met: [] Not Met: [] Adjusted  5. Patient will report being able to concentrate without increased symptoms or restriction (patient specific functional goal)    [] Progressing: [] Met: [] Not Met: [] Adjusted     Progression Towards Functional goals:  [x] Patient is progressing as expected towards functional goals listed. [] Progression is slowed due to complexities listed. [] Progression has been slowed due to co-morbidities. [] Plan just implemented, too soon to assess goals progression  [] Other:     ASSESSMENT:  Patient with less cervical restriction today- good cavitation throughout entire mid cervical spine and CTJ. Helped quite a bit with HA. Patient able to progress oculomotor exercises and BIODEX further today.       Return to Play: (if applicable)   []  Stage 1: Intro to Strength   []  Stage 2: Dynamic Strength and Intro to Plyometrics   []  Stage 3: Advanced Plyometrics and Intro to Throwing   []  Stage 4: Sport specific Training/Return to Sport     []  Ready to Return to Play, [employer]   []  Not Ready for Return to Sports   Comments:      Treatment/Activity Tolerance:  [x] Patient tolerated treatment well [] Patient limited by fatique  [] Patient limited by pain  [] Patient limited by other medical

## 2019-09-03 ENCOUNTER — HOSPITAL ENCOUNTER (OUTPATIENT)
Dept: PHYSICAL THERAPY | Age: 19
Setting detail: THERAPIES SERIES
Discharge: HOME | End: 2019-09-03
Payer: COMMERCIAL

## 2019-09-03 PROCEDURE — 97110 THERAPEUTIC EXERCISES: CPT

## 2019-09-03 PROCEDURE — 97140 MANUAL THERAPY 1/> REGIONS: CPT

## 2019-09-03 PROCEDURE — 97112 NEUROMUSCULAR REEDUCATION: CPT

## 2019-09-03 NOTE — FLOWSHEET NOTE
driving/computer work. Home Exercise Program:    [x] (14904) Reviewed/Progressed HEP activities related to strengthening, flexibility, endurance, ROM of scapular, scapulothoracic and UE control with self care, reaching, carrying, lifting, house/yardwork, driving/computer work  [] (04549) Reviewed/Progressed HEP activities related to improving balance, coordination, kinesthetic sense, posture, motor skill, proprioception of scapular, scapulothoracic and UE control with self care, reaching, carrying, lifting, house/yardwork, driving/computer work      Manual Treatments:  PROM / STM / Oscillations-Mobs:  G-I, II, III, IV (PA's, Inf., Post.)  [x] (92858) Provided manual therapy to mobilize soft tissue/joints of cervical/CT, scapular GHJ and UE for the purpose of modulating pain, promoting relaxation,  increasing ROM, reducing/eliminating soft tissue swelling/inflammation/restriction, improving soft tissue extensibility and allowing for proper ROM for normal function with self care, reaching, carrying, lifting, house/yardwork, driving/computer work    Modalities:      Charges:  Timed Code Treatment Minutes: 50   Total Treatment Minutes: 51     [] EVAL (LOW) 68439 (typically 20 minutes face-to-face)  [] EVAL (MOD) 60697 (typically 30 minutes face-to-face)  [] EVAL (HIGH) 49649 (typically 45 minutes face-to-face)  [] RE-EVAL     [x] CA(49825) x  1   [] IONTO  [x] NMR (60622) x  1   [] VASO  [x] Manual (46518) x  1    [] Other:  [] TA x       [] Mech Traction (98899)  [] ES(attended) (19357)      [] ES (un) (85241):     GOALS:  Patient stated goal: get rid of HA  [] Progressing: [] Met: [] Not Met: [] Adjusted    Therapist goals for Patient:   Short Term Goals: To be achieved in: 2 weeks  1. Independent in HEP and progression per patient tolerance, in order to prevent re-injury. [] Progressing: [] Met: [] Not Met: [] Adjusted  2.  Patient will have a decrease in pain to facilitate improvement in movement, function, and

## 2019-09-05 ENCOUNTER — OFFICE VISIT (OUTPATIENT)
Dept: PRIMARY CARE CLINIC | Age: 19
End: 2019-09-05
Payer: COMMERCIAL

## 2019-09-05 ENCOUNTER — HOSPITAL ENCOUNTER (OUTPATIENT)
Dept: PHYSICAL THERAPY | Age: 19
Setting detail: THERAPIES SERIES
Discharge: HOME | End: 2019-09-05
Payer: COMMERCIAL

## 2019-09-05 VITALS
SYSTOLIC BLOOD PRESSURE: 110 MMHG | OXYGEN SATURATION: 99 % | WEIGHT: 129.3 LBS | DIASTOLIC BLOOD PRESSURE: 70 MMHG | BODY MASS INDEX: 19.37 KG/M2 | HEART RATE: 68 BPM

## 2019-09-05 DIAGNOSIS — S06.0X0D CONCUSSION WITHOUT LOSS OF CONSCIOUSNESS, SUBSEQUENT ENCOUNTER: Primary | ICD-10-CM

## 2019-09-05 PROCEDURE — 99212 OFFICE O/P EST SF 10 MIN: CPT | Performed by: INTERNAL MEDICINE

## 2019-09-05 PROCEDURE — 97112 NEUROMUSCULAR REEDUCATION: CPT

## 2019-09-05 PROCEDURE — 97140 MANUAL THERAPY 1/> REGIONS: CPT

## 2019-09-05 PROCEDURE — 97110 THERAPEUTIC EXERCISES: CPT

## 2019-09-05 NOTE — FLOWSHEET NOTE
[place] [address] [facility] [address]  Phone: [phone] Fax: [phone]      Physical Therapy Daily Treatment Note  Date:  2019    Patient Name:  Lisa Toribio    :  2000  MRN: [id number]  Restrictions/Precautions:    Medical/Treatment Diagnosis Information:  · Diagnosis: concussion  · Treatment Diagnosis: concussion P63.2O5J  Insurance/Certification information:  PT Insurance Information: Aetna/[place]  Physician Information:  Referring Practitioner: Dr. Viki Delaney of care signed (Y/N):     Date of Patient follow up with Physician:     G-Code (if applicable):      Date G-Code Applied:  2019   NDI 28% disability    Progress Note: [x]  Yes  []  No  Next due by: Visit #10      Latex Allergy:  [x]NO      []YES  Preferred Language for Healthcare:   [x]English       []other:    Visit # Insurance Allowable   6 Aetna/[place]     Pain level:  0/10     SUBJECTIVE:  Patient reports that she felt much better after last session. States that she has not had any HA. Passed her impact test but still has issues with concentration during homework- notes that this has likely been there since her first concussion. Reports that she feels concentration is about 50% of her normal- feeling all other s/s are doing very well.       OBJECTIVE:   Observation:   Test measurements:      RESTRICTIONS/PRECAUTIONS: n/a    Exercises/Interventions:   Therapeutic Ex (47450): 17 min Sets/sec Reps CUES/Notes   UBE 1 5 No s/s   Pendulum/Ball rolls      Lateral jumps + gaze stab 2 10 No s/s   Wall jumps + gaze stab 2 10 No s/s   Seated ball toss across visual field and with eye tracking 2 2 Red ball   Walking ball toss across visual field and with eye tracking 2 2 Red ball   Head turned 45* with eyes scanning environment + head turns to alternate 2 4 No s/s   Gaze stabilization head turns horizontal and vertical with walking 2 3 No s/s   Seated cervical rotation self stretch np     Gaze stabilization 2 15 Vert/horiz/diagonal- no symptoms   Chin tuck NP  yellow   Smooth pursuits 2 15 Vert/horiz/diagonal- no symptoms   Saccades 2 15 Vert/horiz/diagonal- no symtpoms    Manual Intervention  (61594): 14 min      Shld /GH Mobs      Post Cap mobs      Thoracic/Rib manipualtion np  Upper thoracic   CT MT/Mobs 1 4 Prone- no cav   PROM MT      Cervical mobs 1 6 bilat-mid cervical   STM 1 4 Subocc, paraspinals   NMR re-education (72582): 21 min      T-spine Ext      GH depress/compress      Scap/GH NMR      Body blade      Wall ball roll      Wall Ball bounce      Balance: with Airex pad  1. Tandem stand EC  2. Tandem stand EO + head turns  3. SLS EC  4. SLS EO + head turns   1. 2 x 10  2. 2 x 10  3. 2 x 10  4. 2 x 10    1. More difficult with R leg behind  3. More difficult with R LE Difficulty with proprioception and balance throughout all balance; no s/s  With #2; mild increase in s/s #3               BIODEX 1 10 Level 8 for all: (3 sets each): 1. Random control (med [place] and med speed- range 87-92%  2. Maze (medium): range 74-88%  3. Limits of stability: range 60-74%          Therapeutic Exercise and NMR EXR  [x] (91545) Provided verbal/tactile cueing for activities related to strengthening, flexibility, endurance, ROM  for improvements in scapular, scapulothoracic and UE control with self care, reaching, carrying, lifting, house/yardwork, driving/computer work. [x] (41845) Provided verbal/tactile cueing for activities related to improving balance, coordination, kinesthetic sense, posture, motor skill, proprioception  to assist with  scapular, scapulothoracic and UE control with self care, reaching, carrying, lifting, house/yardwork, driving/computer work.     Therapeutic Activities:    [] (73674 or 50437) Provided verbal/tactile cueing for activities related to improving balance, coordination, kinesthetic sense, posture, motor skill, proprioception and motor Met: [] Not Met: [] Adjusted  2. Patient will have a decrease in pain to facilitate improvement in movement, function, and ADLs as indicated by Functional Deficits. [] Progressing: [] Met: [] Not Met: [] Adjusted    Long Term Goals: To be achieved in: 4 weeks  1. Disability index score of 10% or less for the NDI to assist with reaching prior level of function. [] Progressing: [] Met: [] Not Met: [] Adjusted  2. Patient will demonstrate increased AROM to [facility] of cervical/thoracic spine to allow for proper joint functioning as indicated by patients Functional Deficits. [] Progressing: [] Met: [] Not Met: [] Adjusted  3. Patient will demonstrate an increase in postural awareness and control and activation of  Deep cervical stabilizers to allow for proper functional mobility as indicated by patients Functional Deficits. [] Progressing: [] Met: [] Not Met: [] Adjusted  4. Patient will return to field hockey activities without increased symptoms or restriction. [] Progressing: [] Met: [] Not Met: [] Adjusted  5. Patient will report being able to concentrate without increased symptoms or restriction (patient specific functional goal)    [] Progressing: [] Met: [] Not Met: [] Adjusted     Progression Towards Functional goals:  [x] Patient is progressing as expected towards functional goals listed. [] Progression is slowed due to complexities listed. [] Progression has been slowed due to co-morbidities. [] Plan just implemented, too soon to assess goals progression  [] Other:     ASSESSMENT:  Patient with less cervical restriction today- minimally tight throughout mid cervical spine bilaterally- improved with joint mobilization. Good movement with CTJ- no audible cavitation though. Patient did well with all oculomotor and vestibular exercises; significant difficulty still with EC proprioceptive exercises with mild increase in s/s with EC SLS.  Able to progress BIODEX further today, as well as begin vertical/light

## 2019-09-19 ENCOUNTER — HOSPITAL ENCOUNTER (OUTPATIENT)
Dept: PHYSICAL THERAPY | Age: 19
Setting detail: THERAPIES SERIES
Discharge: HOME | End: 2019-09-19
Payer: COMMERCIAL

## 2019-09-19 PROCEDURE — 97110 THERAPEUTIC EXERCISES: CPT

## 2019-09-19 PROCEDURE — 97112 NEUROMUSCULAR REEDUCATION: CPT

## 2019-09-19 PROCEDURE — 97140 MANUAL THERAPY 1/> REGIONS: CPT

## 2019-09-19 NOTE — FLOWSHEET NOTE
stretch np     Gaze stabilization 2 15 Vert/horiz/diagonal- no symptoms   Chin tuck 10sec 15 Yellow-blue   Smooth pursuits np  Vert/horiz/diagonal- no symptoms   Saccades np  Vert/horiz/diagonal- no symtpoms    Manual Intervention  (18442): 16 min      Shld /GH Mobs      Post Cap mobs      Thoracic/Rib manipualtion np  Upper thoracic   CT MT/Mobs np  Prone- no cav   PROM MT      Cervical mobs 1 6 bilat-mid cervical   STM 1 10 Subocc, paraspinals and B SCM   NMR re-education (11293): 14 min      T-spine Ext      GH depress/compress      Scap/GH NMR      Body blade      Wall ball roll      Wall Ball bounce      Balance: with [employer]x pad  1. Tandem stand EC  2. Tandem stand EO + head turns and + vertical motion  3. SLS EC  4. SLS EO + head turns + vertical motion   1. 20 sec x 6 reps  2. 10 sec x 6 reps each  3. 20sec x 6 reps  4. 10sec x 6 reps each     Less difficulty today with proprioception and balance compared to last session. No increased s/s from what she was already having (5-6/10)               BIODEX np  Level 8 for all: (3 sets each): 1. Random control (med [place] and med speed- range 87-92%  2. Maze (medium): range 74-88%  3. Limits of stability: range 60-74%          Therapeutic Exercise and NMR EXR  [x] (18641) Provided verbal/tactile cueing for activities related to strengthening, flexibility, endurance, ROM  for improvements in scapular, scapulothoracic and UE control with self care, reaching, carrying, lifting, house/yardwork, driving/computer work. [x] (58226) Provided verbal/tactile cueing for activities related to improving balance, coordination, kinesthetic sense, posture, motor skill, proprioception  to assist with  scapular, scapulothoracic and UE control with self care, reaching, carrying, lifting, house/yardwork, driving/computer work.     Therapeutic Activities:    [] (66251 or 33612) Provided verbal/tactile cueing for activities related to improving balance, coordination, kinesthetic sense,
No

## 2019-09-24 ENCOUNTER — HOSPITAL ENCOUNTER (OUTPATIENT)
Dept: PHYSICAL THERAPY | Age: 19
Setting detail: THERAPIES SERIES
Discharge: HOME | End: 2019-09-24
Payer: COMMERCIAL

## 2019-09-24 PROCEDURE — 97110 THERAPEUTIC EXERCISES: CPT

## 2019-09-24 PROCEDURE — 97140 MANUAL THERAPY 1/> REGIONS: CPT

## 2019-09-24 PROCEDURE — 97112 NEUROMUSCULAR REEDUCATION: CPT

## 2019-09-24 NOTE — FLOWSHEET NOTE
[facility] [address] [facility] [address]  Phone: [phone] Fax: [phone]      Physical Therapy Daily Treatment Note  Date:  2019    Patient Name:  Xavier Curran    :  2000  MRN: [id number]  Restrictions/Precautions:    Medical/Treatment Diagnosis Information:  · Diagnosis: concussion  · Treatment Diagnosis: concussion P84.4O7N  Insurance/Certification information:  PT Insurance Information: Aetna/[place]  Physician Information:  Referring Practitioner: Dr. Codey Lynn of care signed (Y/N):     Date of Patient follow up with Physician:     G-Code (if applicable):      Date G-Code Applied:  2019   NDI 28% disability    Progress Note: [x]  Yes  []  No  Next due by: Visit #10      Latex Allergy:  [x]NO      []YES  Preferred Language for Healthcare:   [x]English       []other:    Visit # Insurance Allowable   8 Aetna/AG/[employer]     Pain level:  5/10 HA    SUBJECTIVE:  Patient reports that she continues to improve slowly. Is not having any issues with dizziness any longer. States that she has had no issues with playing in games. Notes that her concentration is improved to 80% of her normal now. Still has some HA at times.     OBJECTIVE:   Observation:   Test measurements:      RESTRICTIONS/PRECAUTIONS: n/a    Exercises/Interventions:   Therapeutic Ex (76070): 20 min Sets/sec Reps CUES/Notes   UBE 1 5 No s/s   Start position to stand with stick and gaze stab NP  Mild increase HA on L   Lateral jumps + gaze stab- TRX 2 10 No s/s   Wall jumps + gaze stab 2 10 No s/s   BOSU side steps/jumps 1 10 Mild dizziness   [place] jog multi direction NP  No s/s   KB squat with blue 2 10 No s/s   Gaze stabilization head turns horizontal and vertical with walking np  No s/s   Seated cervical rotation self stretch np     Gaze stabilization np  Vert/horiz/diagonal- no symptoms   Chin tuck 10sec 15 Yellow-blue   Broad jumps 2 10    Side step to every other week- depending on s/s. Continue with POC, progress as appropriate and as tolerated.       Return to Play: (if applicable)   []  Stage 1: Intro to Strength   []  Stage 2: Dynamic Strength and Intro to Plyometrics   []  Stage 3: Advanced Plyometrics and Intro to Throwing   []  Stage 4: Sport specific Training/Return to Sport     []  Ready to Return to Play, [employer]   []  Not Ready for Return to Sports   Comments:      Treatment/Activity Tolerance:  [x] Patient tolerated treatment well [] Patient limited by fatique  [] Patient limited by pain  [] Patient limited by other medical complications  [] Other:     Prognosis: [x] Good [] Fair  [] Poor    Patient Requires Follow-up: [x] Yes  [] No    PLAN: 2x week for 4 weeks to address concussion symptoms and cervical spine limitations  [x] Continue per plan of care [] Alter current plan (see comments)  [] Plan of care initiated [] Hold pending MD visit [] Discharge    Electronically signed by: Michael Katz

## 2021-03-10 ENCOUNTER — HOSPITAL ENCOUNTER (OUTPATIENT)
Dept: PHYSICAL THERAPY | Age: 21
Setting detail: THERAPIES SERIES
Discharge: HOME | End: 2021-03-10
Payer: COMMERCIAL

## 2021-03-10 PROCEDURE — 97140 MANUAL THERAPY 1/> REGIONS: CPT

## 2021-03-10 PROCEDURE — 97161 PT EVAL LOW COMPLEX 20 MIN: CPT

## 2021-03-10 NOTE — PLAN OF CARE
[address] [place], [place] [address]Richard agosto  Phone: [phone]  Fax:     [phone]         Physical Therapy Certification    Dear Referring Practitioner: Dr Frederic Jones,    We had the pleasure of evaluating the following patient for physical therapy services at [address] [place] [address]. A summary of our findings can be found in the initial assessment below. This includes our plan of care. If you have any questions or concerns regarding these findings, please do not hesitate to contact me at the office phone number checked above. Thank you for the referral.       Physician Signature:_______________________________Date:__________________  By signing above (or electronic signature), therapists plan is approved by physician            Patient: Hiral Salamanca   : 2000   MRN: [id number]  Referring Physician: Referring Practitioner: Dr Frederic Jones      Evaluation Date: 3/10/2021      Medical Diagnosis Information:  Diagnosis: concussion   Treatment Diagnosis: concussion S06. EL Mendez                                         Insurance information: PT Insurance Information: Aetna/AG/[place]    Precautions/ Contra-indications: none  Latex Allergy:  [x]NO      []YES  Preferred Language for Healthcare:   [x]English       []other:    C-SSRS Triggered by Intake questionnaire (Past 2 wk assessment ):   [x] No, Questionnaire did not trigger screening.   [] Yes, Patient intake triggered C-SSRS Screening      [] C-SSRS Screening completed  [] PCP notified via Epic     SUBJECTIVE: Patient stated complaint: Patient reports that she got hit in the side of the neck about 4 weeks ago. States that it hurt her neck - had a HA for 2 days but then went away. 2 weeks later she got hit in the head with a ball at practice and had concussion symptoms.  Thought it would go away and didn't let anyone know initially, until the following week. Last week she took impact test. Took 2nd impact test today and failed the 2nd test as well. Limited with reaction time on this last test. Overall has been 4 weeks since first injury and 2 weeks since last injury. Patient reports that she still has HA, some nausea, difficulty concentrating. Denies dizziness, light or noise sensitivity. Has returned to practice without contact and light cardio. Has hx of concussion; this is number 5 (2nd since college started).     Relevant Medical History:prior hx of concussion  Functional Scale/Score: DHI 6% disability, NDI 20% disability    Pain Scale: 4/10 HA  Easing factors: rest/quite  Provocative factors: studying     Type: []Constant   [x]Intermittent  []Radiating []Localized []other:     Numbness/Tingling: none    Occupation/School:student, field      Living Status/Prior Level of Function: Independent with ADLs and IADLs, field hockey    OBJECTIVE:     Special Test Results Symptoms and Time until Resolution of Symptoms   Dizziness Handicap Inventory DHI 6% disability    BERTHA 5 errors level ground, 7 errors unlevel ground                  VOMS Not Tested Headache 0-10 Dizziness 0-10 Nausea 0-10 Fogginess 0-10 Comments   Baseline Symptoms:  3 0 0 4    Smooth Pursuits  3/3 .5/.5 0/0 5/5 Horizontal/vertical   Saccades-Horizontal    3 0 0 4    Saccades-Vertical  3 0 0 4    Convergence (near Point)      Near Point in cm  Measure 1:  Measure 2:  Measure 3:         CERV ROM     Cervical Flexion 60    Cervical Extension 65    Cervical SB 45 42   Cervical rotation 76 85        ROM Left Right   Shoulder Flex wnl wnl   Shoulder Abd wnl wnl   Shoulder ER wnl wnl   Shoulder IR wnl wnl                  Cervical Strength Right  Left   Flexion wnl wnl   Extension wnl wnl   Rotation wnl wnl   Lateral Flexion wnl wnl           Special Test Right  Left   Spurlings     Sharp Pursor     VBI      Joint Mobility WNL Limited in sidegliding and upgliding   General Myotomes testing (listed deficits)                   Reflexes Normal Abnormal Comments   [x]Reflexes equal and normal bilaterally      S1-2 Seated achilles [] []    S1-2 Prone knee bend [] []    L3-4 Patellar tendon [] []    C5-6 Biceps [] []    C6 Brachioradialis [] []    C7-8 Triceps [] []    Clonus [] []    Babinski [] []    Newsome's [] []      Dermatomes/Myotomes:   [x]Dermatomes/Myotomes intact    []Other:    Joint mobility: L cervical spine   []Normal    [x]Hypo   []Hyper    Palpation: tender to palpate along cervical spine paraspinals, suboccipitals, 1st rib    Functional Mobility/Transfers: limited in ability to concentrate, participate in sport    Posture: WNL    Bandages/Dressings/Incisions: NA    Gait: (include devices/WB status): WNL     Orthopedic Special Tests: n/a                       [x] Patient history, allergies, meds reviewed. Medical chart reviewed. See intake form. Review Of Systems (ROS):  [x]Performed Review of systems (Integumentary, CardioPulmonary, Neurological) by intake and observation. Intake form has been scanned into medical record. Patient has been instructed to contact their primary care physician regarding ROS issues if not already being addressed at this time.       Co-morbidities/Complexities (which will affect course of rehabilitation):   [x]None           Arthritic conditions   []Rheumatoid arthritis (M05.9)  []Osteoarthritis (M19.91)   Cardiovascular conditions   []Hypertension (I10)  []Hyperlipidemia (E78.5)  []Angina pectoris (I20)  []Atherosclerosis (I70)  []CVA Musculoskeletal conditions   []Disc pathology   []Congenital spine pathologies   []Prior surgical intervention  []Osteoporosis (M81.8)  []Osteopenia (M85.8)   Endocrine conditions   []Hypothyroid (E03.9)  []Hyperthyroid Gastrointestinal conditions   []Constipation (G78.67)   Metabolic conditions   []Morbid obesity (E66.01)  []Diabetes type 1(E10.65) or 2 (E11.65)   []Neuropathy (G60.9)     Pulmonary conditions []Asthma (J45)  []Coughing   []COPD (J44.9)   Psychological Disorders  []Anxiety (F41.9)  []Depression (F32.9)   []Other:   []Other:          Barriers to/and or personal factors that will affect rehab potential:              []Age  []Sex   []Smoker              []Motivation/Lack of Motivation                        []Co-Morbidities              []Cognitive Function, education/learning barriers              []Environmental, home barriers              []profession/work barriers  []past PT/medical experience  []other:  Justification:     Falls Risk Assessment (30 days):   [x] Falls Risk assessed and no intervention required. [] Falls Risk assessed and Patient requires intervention due to being higher risk   TUG score (>12s at risk):     [] Falls education provided, including         ASSESSMENT: Patient is a 22 yo female who presents to therapy with new onset of concussion that occurred approximately 2 weeks ago. Upon assessment, patient with decreased L sided cervical joint mobility, increased soft tissue restriction along cervical spine paraspinals and suboccipitals, decreased 1st rib mobility, mild nystagmus with horizontal smooth pursuits, and mild decrease in balance. Patient tolerated all therapy well with good improvement in cervical joint mobility with manual therapy. Patient instructed to begin oculomotor exercises. Patient will benefit from further skilled PT services to address noted deficits.       Functional Impairments:     [x]Noted cervical/thoracic/GHJ joint hypomobility   []Noted cervical/thoracic/GHJ joint hypermobility   [x]Decreased cervical/UE functional ROM   [x]Noted Headache pain aggravated by neck movements with/without dizziness   []Abnormal reflexes/sensation/myotomal/dermatomal deficits   [x]Decreased DCF control or ability to hold head up   []Decreased RC/scapular/core strength and neuromuscular control    []Decreased UE functional strength   []other:      Functional Activity Limitations (from functional questionnaire and intake)   []Reduced ability to tolerate prolonged functional positions   []Reduced ability or difficulty with changes of positions or transfers between positions   []Reduced ability to maintain good posture and demonstrate good body mechanics with sitting, bending, and lifting   [] Reduced ability or tolerance with driving and/or computer work   []Reduced ability to perform lifting, reaching, carrying tasks   [x]Reduced ability to concentrate   []Reduced ability to sleep    []Reduced ability to tolerate any impact through UE or spine   []Reduced ability to ambulate prolonged functional periods/distances   []other:    Participation Restrictions   []Reduced participation in self care activities   []Reduced participation in home management activities   [x]Reduced participation in work activities   [x]Reduced participation in social activities. [x]Reduced participation in sport/recreational activities. Classification/Subgrouping:   [x]signs/symptoms consistent with concussion and neck pain with mobility deficits     []signs/symptoms consistent with neck pain with movement coordinated impairments    []signs/symptoms consistent with neck pain with radiating pain    [x]signs/symptoms consistent with neck pain with headaches (cervicogenic)    []Signs/symptoms consistent with nerve root involvement including myotome & dermatome dysfunction   []sign/symptoms consistent with facet dysfunction of cervical and thoracic spine    []signs/symptoms consistent suggesting central cord compression/UMN syndromes   []signs/symptoms consistent with discogenic cervical pain   []signs/symptoms consistent with rib dysfunction   []signs/symptoms consistent with postural dysfunction   []signs/symptoms consistent with shoulder pathology    []signs/symptoms consistent with post-surgical status including decreased ROM, strength and function.    [x]signs/symptoms consistent with pathology which may benefit from Dry Needling   []signs/symptoms which may limit the use of advanced manual therapy techniques: (Elevated CV risk profile, recent trauma, intolerance to end range positions, prior TIA, visual issues, UE neurological compromise )     Prognosis/Rehab Potential:      []Excellent   [x]Good    []Fair   []Poor    Tolerance of evaluation/treatment:    []Excellent   [x]Good    []Fair   []Poor    Physical Therapy Evaluation Complexity Justification  [x] A history of present problem with:  [x] no personal factors and/or comorbidities that impact the plan of care;  []1-2 personal factors and/or comorbidities that impact the plan of care  []3 personal factors and/or comorbidities that impact the plan of care  [x] An examination of body systems using standardized tests and measures addressing any of the following: body structures and functions (impairments), activity limitations, and/or participation restrictions;:  [x] a total of 1-2 or more elements   [] a total of 3 or more elements   [] a total of 4 or more elements   [x] A clinical presentation with:  [x] stable and/or uncomplicated characteristics   [] evolving clinical presentation with changing characteristics  [] unstable and unpredictable characteristics;   [x] Clinical decision making of [x] low, [] moderate, [] high complexity using standardized patient assessment instrument and/or measurable assessment of functional outcome. [x] EVAL (LOW) 63394 (typically 20 minutes face-to-face)  [] EVAL (MOD) 06379 (typically 30 minutes face-to-face)  [] EVAL (HIGH) 95369 (typically 45 minutes face-to-face)  [] RE-EVAL     PLAN:   Frequency/Duration:  2 days per week for 4 Weeks:  Interventions:  [x]  Therapeutic exercise including: strength training, ROM, for cervical spine,scapula, core and Upper extremity, including postural re-education.    [x]  NMR activation and proprioception for Deep cervical flexors, periscapular and RC muscles and Core, including postural re-education. [x]  Manual therapy as indicated for C/T spine, ribs, Soft tissue to include: Dry Needling/IASTM, STM, PROM, Gr I-IV mobilizations, manipulation. [x] Modalities as needed that may include: thermal agents, E-stim, Biofeedback, US, iontophoresis as indicated  [x] Patient education on joint protection, postural re-education, activity modification, progression of HEP. HEP instruction: oculomotor exercises to include gaze stabilization in horizontal and vertical direction (see scanned forms)    GOALS:  Patient stated goal: return to playing  [] Progressing: [] Met: [] Not Met: [] Adjusted    Therapist goals for Patient:   Short Term Goals: To be achieved in: 2 weeks  1. Independent in HEP and progression per patient tolerance, in order to prevent re-injury. [] Progressing: [] Met: [] Not Met: [] Adjusted  2. Patient will have a decrease in pain and concussion symptoms to facilitate improvement in movement, function, and ADLs as indicated by Functional Deficits. [] Progressing: [] Met: [] Not Met: [] Adjusted    Long Term Goals: To be achieved in: 4 weeks  1. Disability index score of 10% or less for the NDI to assist with reaching prior level of function. [] Progressing: [] Met: [] Not Met: [] Adjusted  2. Patient will demonstrate increased AROM to [facility] of cervical/thoracic spine to allow for proper joint functioning as indicated by patients Functional Deficits. [] Progressing: [] Met: [] Not Met: [] Adjusted  3. Patient will demonstrate an increase in postural awareness and control and activation of  Deep cervical stabilizers to allow for proper functional mobility as indicated by patients Functional Deficits. [] Progressing: [] Met: [] Not Met: [] Adjusted  4. Patient will return to school and sport activities without increased symptoms or restriction. [] Progressing: [] Met: [] Not Met: [] Adjusted  5.  Patient to have normalized concussion symptoms to facilitate participation in movement, function, ADLs. [] Progressing: [] Met: [] Not Met: [] Adjusted   6.  Patient will report no return of HA after practice. (patient specific functional goal)    [] Progressing: [] Met: [] Not Met: [] Adjusted     Electronically signed by:  Guillermo Rodriguez PT

## 2021-03-10 NOTE — FLOWSHEET NOTE
[facility]  [address] [address]  Phone: [phone] Fax: [phone]    Physical Therapy Treatment Note/ Progress Report:     Date:  3/10/2021    Patient Name:  Cherelle Wilhelm    :  2000  MRN: [id number]  Restrictions/Precautions:    Medical/Treatment Diagnosis Information:  · Diagnosis: concussion  · Treatment Diagnosis: concussion I58.5Y6N  Insurance/Certification information:  PT Insurance Information: Aetna/[place]  Physician Information:  Referring Practitioner: Dr Kimberlee Marquez of care signed (Y/N):     Date of Patient follow up with Physician:      Progress Report: [x]  Yes  []  No     Date Range for reporting period:  Beginning: 3/10/2021  Ending: -    Progress report due (10 Rx/or 30 days whichever is less):      Recertification due (POC duration/ or 90 days whichever is less):4/10/2021     Visit # Insurance Allowable Auth Needed   1 Aetna/AG/[place] []Yes    [x]No     Pain level:  4/10     SUBJECTIVE:  See eval    OBJECTIVE: See eval   Observation:    Test measurements:      RESTRICTIONS/PRECAUTIONS: none    INTERVENTIONS:    Exercise/Equipment Resistance/Repetitions Symptoms and duration for Resolution   Cervical Interventions     Stretching            Upper trap            Levator                    Strengthening            Cervical retractions            Rows            Shoulder extensions            Flexion            Extension            Right Rotation            Left Rotation            Right Side Flexion            Left Side Flexion          Vestibular Interventions            Nel-Hallpike            Habituation          Double Leg Standing     Tandem Standing     Single Leg Standing     Biodex     Airex     Rocker Board     BOSU     Balance Beam                         Oculomotor Interventions     Smooth Pursuits     Gaze Stabilization     Saccades-Horizontal     Saccades-Vertical VOR-Hoirzontal     VOR-Vertical     Convergence     Visual Motion Sensitivity  (VMS)                 Therapeutic Ex Sets/sec Reps Notes   UBE      T- band Row/pinch      T- band lower pinch      T- band ER activation      UT stretch      Levator stretch      Isometric at wall      Quadruped w cerv retract      Front plank      Side plank      Chin tuck      Chin tuck w lift      Chin tuck w rotation                  Manual Intervention: 15 min      Cerv mobs/manip 1 5    Thoracic mobs/manip 1 4    CT manip 1 4    Rib mobilizations 1 2    STM 1 5 Suboccipital, paraspinals   DN            NMR re-education      T-spine Ext- foam roll      Chin tucks       No money      Wall Postural re-ed                      Therapeutic Exercise and NMR EXR  [x] (48317) Provided verbal/tactile cueing for activities related to strengthening, flexibility, endurance, ROM  for improvements in cervical, postural, scapular, scapulothoracic and UE control with self care, reaching, carrying, lifting, house/yardwork, driving/computer work. [x] (57249) Provided verbal/tactile cueing for activities related to improving balance, coordination, kinesthetic sense, posture, motor skill, proprioception  to assist with cervical, scapular, scapulothoracic and UE control with self care, reaching, carrying, lifting, house/yardwork, driving/computer work. Therapeutic Activities:    [] (53278 or 44580) Provided verbal/tactile cueing for activities related to improving balance, coordination, kinesthetic sense, posture, motor skill, proprioception and motor activation to allow for proper function of cervical, scapular, scapulothoracic and UE control with self care, carrying, lifting, driving/computer work.      Home Exercise Program:    [x] (20944) Reviewed/Progressed HEP activities related to strengthening, flexibility, endurance, ROM of cervical, scapular, scapulothoracic and UE control with self care, reaching, carrying, lifting, house/yardwork, driving/computer work  [] (24173) Reviewed/Progressed HEP activities related to improving balance, coordination, kinesthetic sense, posture, motor skill, proprioception of cervical, scapular, scapulothoracic and UE control with self care, reaching, carrying, lifting, house/yardwork, driving/computer work      Manual Treatments:  PROM / STM / Oscillations-Mobs:  G-I, II, III, IV (PA's, Inf., Post.)  [x] (96157) Provided manual therapy to mobilize soft tissue/joints of cervical/CT, scapular GHJ and UE for the purpose of decreasing headache, modulating pain, promoting relaxation,  increasing ROM, reducing/eliminating soft tissue swelling/inflammation/restriction, improving soft tissue extensibility and allowing for proper ROM for normal function with self care, reaching, carrying, lifting, house/yardwork, driving/computer work    Modalities:      Charges:  Timed Code Treatment Minutes: 15   Total Treatment Minutes: 45     [x] EVAL (LOW) 72772 (typically 20 minutes face-to-face)  [] EVAL (MOD) 21291 (typically 30 minutes face-to-face)  [] EVAL (HIGH) 11388 (typically 45 minutes face-to-face)  [] RE-EVAL     [] ZK(36420) x     [] Dry needle 1 or 2 Muscles (51617)  [] NMR (37840) x     [] Dry needle 3+ Muscles (54504)  [x] Manual (53986) x  1     [] TA (73454) x     [] Mech Traction (50486)  [] ES(attended) (22913)     [] ES (un) (09263):   [] VASO (60564)  [] Other:      If [facility] Please Indicate Time In/Out  CPT Code Time in Time out                                     GOALS:  Patient stated goal: return to playing  [] Progressing: [] Met: [] Not Met: [] Adjusted    Therapist goals for Patient:   Short Term Goals: To be achieved in: 2 weeks  1. Independent in HEP and progression per patient tolerance, in order to prevent re-injury. [] Progressing: [] Met: [] Not Met: [] Adjusted  2.  Patient will have a decrease in pain and concussion symptoms to facilitate improvement in movement, function, and ADLs as indicated by Functional Deficits. [] Progressing: [] Met: [] Not Met: [] Adjusted    Long Term Goals: To be achieved in: 4 weeks  1. Disability index score of 10% or less for the NDI to assist with reaching prior level of function. [] Progressing: [] Met: [] Not Met: [] Adjusted  2. Patient will demonstrate increased AROM to [facility] of cervical/thoracic spine to allow for proper joint functioning as indicated by patients Functional Deficits. [] Progressing: [] Met: [] Not Met: [] Adjusted  3. Patient will demonstrate an increase in postural awareness and control and activation of  Deep cervical stabilizers to allow for proper functional mobility as indicated by patients Functional Deficits. [] Progressing: [] Met: [] Not Met: [] Adjusted  4. Patient will return to school and sport activities without increased symptoms or restriction. [] Progressing: [] Met: [] Not Met: [] Adjusted  5. Patient to have normalized concussion symptoms to facilitate participation in movement, function, ADLs. [] Progressing: [] Met: [] Not Met: [] Adjusted   6. Patient will report no return of HA after practice. (patient specific functional goal)    [] Progressing: [] Met: [] Not Met: [] Adjusted     ASSESSMENT:  See eval    Treatment/Activity Tolerance:  [x] Patient tolerated treatment well [] Patient limited by fatique  [] Patient limited by pain  [] Patient limited by other medical complications  [] Other:     Overall Progression Towards Functional goals/ Treatment Progress Update:  [] Patient is progressing as expected towards functional goals listed. [] Progression is slowed due to complexities/Impairments listed. [] Progression has been slowed due to co-morbidities.   [x] Plan just implemented, too soon to assess goals progression <30days   [] Goals require adjustment due to lack of progress  [] Patient is not progressing as expected and requires additional follow up with physician  [] Other    Prognosis for POC: [x] Good [] Fair  [] Poor    Patient requires continued skilled intervention: [x] Yes  [] No        PLAN: See eval  [] Continue per plan of care [] Alter current plan (see comments)  [x] Plan of care initiated [] Hold pending MD visit [] Discharge    Electronically signed by: Arthur Cooper, PT    Note: If patient does not return for scheduled/recommended follow up visits, this note will serve as a discharge from care along with the most recent update on progress.

## 2021-03-15 ENCOUNTER — HOSPITAL ENCOUNTER (OUTPATIENT)
Dept: PHYSICAL THERAPY | Age: 21
Setting detail: THERAPIES SERIES
Discharge: HOME | End: 2021-03-15
Payer: COMMERCIAL

## 2021-03-15 PROCEDURE — 97140 MANUAL THERAPY 1/> REGIONS: CPT

## 2021-03-15 PROCEDURE — 97112 NEUROMUSCULAR REEDUCATION: CPT

## 2021-03-15 NOTE — FLOWSHEET NOTE
[place] [address] [facility]  Phone: [phone] Fax: [phone]    Physical Therapy Treatment Note/ Progress Report:     Date:  3/15/2021    Patient Name:  Huber Li    :  2000  MRN: [id number]  Restrictions/Precautions:    Medical/Treatment Diagnosis Information:  · Diagnosis: concussion  · Treatment Diagnosis: concussion G65.2J0T  Insurance/Certification information:  PT Insurance Information: Aetna/AG/[place]ami  Physician Information:  Referring Practitioner: Dr Malgorzata Jimenez of care signed (Y/N):     Date of Patient follow up with Physician:      Progress Report: [x]  Yes  []  No     Date Range for reporting period:  Beginning: 3/10/2021  Ending: -    Progress report due (10 Rx/or 30 days whichever is less):      Recertification due (POC duration/ or 90 days whichever is less):4/10/2021     Visit # Insurance Allowable Auth Needed   2 Aetna/AG/[place] []Yes    [x]No     Pain level:  0/10 neck, 2/10 HA     SUBJECTIVE:  Patient reports that she hasn't had any neck pain since last session. Notes that she does continue to have HA throughout the day and worsens when she studies. Increased to 5/10 HA with studying. Was able to practice without contact without any issues. Didn't have any HA during playing, but had a bad HA yesterday- not sure if that is why or not.      OBJECTIVE: See eval   Observation:    Test measurements:      RESTRICTIONS/PRECAUTIONS: none    INTERVENTIONS:    Exercise/Equipment Resistance/Repetitions Symptoms and duration for Resolution   Cervical Interventions     Stretching            Upper trap            Levator     Strengthening            Cervical retractions            Rows            Shoulder extensions            Flexion            Extension            Right Rotation            Left Rotation            Right Side Flexion            Left Side Flexion          Vestibular skill, proprioception and motor activation to allow for proper function of cervical, scapular, scapulothoracic and UE control with self care, carrying, lifting, driving/computer work.      Home Exercise Program:    [x] (40459) Reviewed/Progressed HEP activities related to strengthening, flexibility, endurance, ROM of cervical, scapular, scapulothoracic and UE control with self care, reaching, carrying, lifting, house/yardwork, driving/computer work  [] (31031) Reviewed/Progressed HEP activities related to improving balance, coordination, kinesthetic sense, posture, motor skill, proprioception of cervical, scapular, scapulothoracic and UE control with self care, reaching, carrying, lifting, house/yardwork, driving/computer work      Manual Treatments:  PROM / STM / Oscillations-Mobs:  G-I, II, III, IV (PA's, Inf., Post.)  [x] (86780) Provided manual therapy to mobilize soft tissue/joints of cervical/CT, scapular GHJ and UE for the purpose of decreasing headache, modulating pain, promoting relaxation,  increasing ROM, reducing/eliminating soft tissue swelling/inflammation/restriction, improving soft tissue extensibility and allowing for proper ROM for normal function with self care, reaching, carrying, lifting, house/yardwork, driving/computer work    Modalities:      Charges:  Timed Code Treatment Minutes: 40   Total Treatment Minutes: 40     [] EVAL (LOW) 21985 (typically 20 minutes face-to-face)  [] EVAL (MOD) 69164 (typically 30 minutes face-to-face)  [] EVAL (HIGH) 82996 (typically 45 minutes face-to-face)  [] RE-EVAL     [] GZ(70516) x     [] Dry needle 1 or 2 Muscles (79983)  [x] NMR (17635) x  2   [] Dry needle 3+ Muscles (05738)  [x] Manual (61379) x  1     [] TA (41348) x     [] Mech Traction (42694)  [] ES(attended) (01203)     [] ES (un) (58183):   [] VASO (26833)  [] Other:      If BWC Please Indicate Time In/Out  CPT Code Time in Time out                                     GOALS:  Patient stated goal: return to playing  [] Progressing: [] Met: [] Not Met: [] Adjusted    Therapist goals for Patient:   Short Term Goals: To be achieved in: 2 weeks  1. Independent in HEP and progression per patient tolerance, in order to prevent re-injury. [] Progressing: [] Met: [] Not Met: [] Adjusted  2. Patient will have a decrease in pain and concussion symptoms to facilitate improvement in movement, function, and ADLs as indicated by Functional Deficits. [] Progressing: [] Met: [] Not Met: [] Adjusted    Long Term Goals: To be achieved in: 4 weeks  1. Disability index score of 10% or less for the NDI to assist with reaching prior level of function. [] Progressing: [] Met: [] Not Met: [] Adjusted  2. Patient will demonstrate increased AROM to [facility] of cervical/thoracic spine to allow for proper joint functioning as indicated by patients Functional Deficits. [] Progressing: [] Met: [] Not Met: [] Adjusted  3. Patient will demonstrate an increase in postural awareness and control and activation of  Deep cervical stabilizers to allow for proper functional mobility as indicated by patients Functional Deficits. [] Progressing: [] Met: [] Not Met: [] Adjusted  4. Patient will return to school and sport activities without increased symptoms or restriction. [] Progressing: [] Met: [] Not Met: [] Adjusted  5. Patient to have normalized concussion symptoms to facilitate participation in movement, function, ADLs. [] Progressing: [] Met: [] Not Met: [] Adjusted   6. Patient will report no return of HA after practice. (patient specific functional goal)    [] Progressing: [] Met: [] Not Met: [] Adjusted     ASSESSMENT:  Patient with decreasing cervical restriction. Still noted in mid cervical spine and 1st rib. Patient did well with all manual therapy. Progressed oculomotor and vestibular exercises.      Treatment/Activity Tolerance:  [x] Patient tolerated treatment well [] Patient limited by fatique  [] Patient limited by pain  [] Patient limited by other medical complications  [] Other:     Overall Progression Towards Functional goals/ Treatment Progress Update:  [] Patient is progressing as expected towards functional goals listed. [] Progression is slowed due to complexities/Impairments listed. [] Progression has been slowed due to co-morbidities. [x] Plan just implemented, too soon to assess goals progression <30days   [] Goals require adjustment due to lack of progress  [] Patient is not progressing as expected and requires additional follow up with physician  [] Other    Prognosis for POC: [x] Good [] Fair  [] Poor    Patient requires continued skilled intervention: [x] Yes  [] No        PLAN: See eval  [] Continue per plan of care [] Alter current plan (see comments)  [x] Plan of care initiated [] Hold pending MD visit [] Discharge    Electronically signed by: Little Nascimento PT    Note: If patient does not return for scheduled/recommended follow up visits, this note will serve as a discharge from care along with the most recent update on progress.

## 2021-03-17 ENCOUNTER — HOSPITAL ENCOUNTER (OUTPATIENT)
Dept: PHYSICAL THERAPY | Age: 21
Setting detail: THERAPIES SERIES
Discharge: HOME | End: 2021-03-17
Payer: COMMERCIAL

## 2021-03-17 PROCEDURE — 97110 THERAPEUTIC EXERCISES: CPT

## 2021-03-17 PROCEDURE — 97140 MANUAL THERAPY 1/> REGIONS: CPT

## 2021-03-17 NOTE — FLOWSHEET NOTE
[place] [address] [facility]  Phone: [phone] Fax: [phone]    Physical Therapy Treatment Note/ Progress Report:     Date:  3/17/2021    Patient Name:  García Wills    :  2000  MRN: [id number]  Restrictions/Precautions:    Medical/Treatment Diagnosis Information:  · Diagnosis: concussion  · Treatment Diagnosis: concussion O44.6W0Q  Insurance/Certification information:  PT Insurance Information: Aetna/AG/MIami  Physician Information:  Referring Practitioner: Dr Dagmar Collazo of care signed (Y/N):     Date of Patient follow up with Physician:      Progress Report: [x]  Yes  []  No     Date Range for reporting period:  Beginning: 3/10/2021  Ending: -    Progress report due (10 Rx/or 30 days whichever is less): 3/25/9054     Recertification due (POC duration/ or 90 days whichever is less):4/10/2021     Visit # Insurance Allowable Auth Needed   3 Aetna/AG/[place] []Yes    [x]No     Pain level:  0/10 neck, 0/10 HA     SUBJECTIVE:  Patient reports that she has been feeling really good. No HA or neck pain today. Reports that she was able to practice with contact with no return of symptoms. Will be able to play in games this coming weekend. Is feeling about 90% improved since onset of therapy.        OBJECTIVE: See eval   Observation:    Test measurements:      RESTRICTIONS/PRECAUTIONS: none    INTERVENTIONS:    Exercise/Equipment Resistance/Repetitions Symptoms and duration for Resolution   Cervical Interventions     Stretching            Upper trap            Levator     Strengthening            Cervical retractions            Rows            Shoulder extensions            Flexion            Extension            Right Rotation            Left Rotation            Right Side Flexion            Left Side Flexion          Vestibular Interventions: 5 min          Biodex 1 min duration x 3 reps: Level 9 Random control -med [place]- med speed (95%, 90%, 92% )  Maze control- medium (76%,82%, 74%)  Limits of stability -easy (54%, 72%, 61%)   BOSU          Oculomotor Interventions: 10 min     Smooth Pursuits X 20  Hoirz, vertical, diagonal   Gaze Stabilization X 20 horiz and vertical   Saccades-Horizontal X 20     Saccades-Vertical X 20    Saccades- Diagonal X 20    Ball toss with gaze stab 2 x 20    Ball toss with eye tracking 2 x 20    Walking head turns and nod  x 20    Scanning environment X 10           Therapeutic Ex Sets/sec Reps Notes   UBE      T- band Row/pinch      T- band lower pinch      T- band ER activation      UT stretch      Levator stretch      Isometric at wall      Quadruped w cerv retract      Front plank      Side plank      Chin tuck      Chin tuck w lift      Chin tuck w rotation                  Manual Intervention: 20 min      Cerv mobs/manip 1 5 L side opening, grade III   Thoracic mobs/manip 1 4 Supine and prone   CT manip 1 4 seated   Rib mobilizations 1 2 L 1st rib   STM 1 5 L paraspinals and UT   DN                Therapeutic Exercise and NMR EXR  [x] (75774) Provided verbal/tactile cueing for activities related to strengthening, flexibility, endurance, ROM  for improvements in cervical, postural, scapular, scapulothoracic and UE control with self care, reaching, carrying, lifting, house/yardwork, driving/computer work. [x] (16480) Provided verbal/tactile cueing for activities related to improving balance, coordination, kinesthetic sense, posture, motor skill, proprioception  to assist with cervical, scapular, scapulothoracic and UE control with self care, reaching, carrying, lifting, house/yardwork, driving/computer work.     Therapeutic Activities:    [] (54453 or 22749) Provided verbal/tactile cueing for activities related to improving balance, coordination, kinesthetic sense, posture, motor skill, proprioception and motor activation to allow for proper function of cervical, scapular, scapulothoracic and UE control with self care, carrying, lifting, driving/computer work.      Home Exercise Program:    [x] (62103) Reviewed/Progressed HEP activities related to strengthening, flexibility, endurance, ROM of cervical, scapular, scapulothoracic and UE control with self care, reaching, carrying, lifting, house/yardwork, driving/computer work  [] (49799) Reviewed/Progressed HEP activities related to improving balance, coordination, kinesthetic sense, posture, motor skill, proprioception of cervical, scapular, scapulothoracic and UE control with self care, reaching, carrying, lifting, house/yardwork, driving/computer work      Manual Treatments:  PROM / STM / Oscillations-Mobs:  G-I, II, III, IV (PA's, Inf., Post.)  [x] (37136) Provided manual therapy to mobilize soft tissue/joints of cervical/CT, scapular GHJ and UE for the purpose of decreasing headache, modulating pain, promoting relaxation,  increasing ROM, reducing/eliminating soft tissue swelling/inflammation/restriction, improving soft tissue extensibility and allowing for proper ROM for normal function with self care, reaching, carrying, lifting, house/yardwork, driving/computer work    Modalities:      Charges:  Timed Code Treatment Minutes: 35   Total Treatment Minutes: 35     [] EVAL (LOW) 52414 (typically 20 minutes face-to-face)  [] EVAL (MOD) 74178 (typically 30 minutes face-to-face)  [] EVAL (HIGH) 24386 (typically 45 minutes face-to-face)  [] RE-EVAL     [] FC(97131) x     [] Dry needle 1 or 2 Muscles (73971)  [x] NMR (23432) x  1   [] Dry needle 3+ Muscles (47851)  [x] Manual (33612) x  1     [] TA (88065) x     [] Mech Traction (58780)  [] ES(attended) (47094)     [] ES (un) (20373):   [] VASO (21421)  [] Other:      If BWC Please Indicate Time In/Out  CPT Code Time in Time out                                     GOALS:  Patient stated goal: return to playing  [] Progressing: [] Met: [] Not Met: [] Adjusted    Therapist goals for Patient:   Short Term Goals: To be achieved in: 2 weeks  1. Independent in HEP and progression per patient tolerance, in order to prevent re-injury. [] Progressing: [] Met: [] Not Met: [] Adjusted  2. Patient will have a decrease in pain and concussion symptoms to facilitate improvement in movement, function, and ADLs as indicated by Functional Deficits. [] Progressing: [] Met: [] Not Met: [] Adjusted    Long Term Goals: To be achieved in: 4 weeks  1. Disability index score of 10% or less for the NDI to assist with reaching prior level of function. [] Progressing: [] Met: [] Not Met: [] Adjusted  2. Patient will demonstrate increased AROM to [facility] of cervical/thoracic spine to allow for proper joint functioning as indicated by patients Functional Deficits. [] Progressing: [] Met: [] Not Met: [] Adjusted  3. Patient will demonstrate an increase in postural awareness and control and activation of  Deep cervical stabilizers to allow for proper functional mobility as indicated by patients Functional Deficits. [] Progressing: [] Met: [] Not Met: [] Adjusted  4. Patient will return to school and sport activities without increased symptoms or restriction. [] Progressing: [] Met: [] Not Met: [] Adjusted  5. Patient to have normalized concussion symptoms to facilitate participation in movement, function, ADLs. [] Progressing: [] Met: [] Not Met: [] Adjusted   6. Patient will report no return of HA after practice. (patient specific functional goal)    [] Progressing: [] Met: [] Not Met: [] Adjusted     ASSESSMENT:  Patient with decreasing cervical restriction. Still noted in at C5/6 and 1st rib. Patient did well with all manual therapy with overall less restriction. Did well with all oculomotor and vestibular exercises.      Treatment/Activity Tolerance:  [x] Patient tolerated treatment well [] Patient limited by fatique  [] Patient limited by pain  [] Patient limited by other medical complications  [] Other:     Overall Progression Towards Functional goals/ Treatment Progress Update:  [] Patient is progressing as expected towards functional goals listed. [] Progression is slowed due to complexities/Impairments listed. [] Progression has been slowed due to co-morbidities. [x] Plan just implemented, too soon to assess goals progression <30days   [] Goals require adjustment due to lack of progress  [] Patient is not progressing as expected and requires additional follow up with physician  [] Other    Prognosis for POC: [x] Good [] Fair  [] Poor    Patient requires continued skilled intervention: [x] Yes  [] No        PLAN: See eval  [] Continue per plan of care [] Alter current plan (see comments)  [x] Plan of care initiated [] Hold pending MD visit [] Discharge    Electronically signed by: Juan Hernández PT    Note: If patient does not return for scheduled/recommended follow up visits, this note will serve as a discharge from care along with the most recent update on progress.

## 2021-03-22 ENCOUNTER — HOSPITAL ENCOUNTER (OUTPATIENT)
Dept: PHYSICAL THERAPY | Age: 21
Setting detail: THERAPIES SERIES
Discharge: HOME | End: 2021-03-22
Payer: COMMERCIAL

## 2021-03-22 PROCEDURE — 97110 THERAPEUTIC EXERCISES: CPT

## 2021-03-22 PROCEDURE — 97140 MANUAL THERAPY 1/> REGIONS: CPT

## 2021-03-22 NOTE — FLOWSHEET NOTE
[place] [address] [place]  Phone: [phone] Fax: [phone]    Physical Therapy Treatment Note/ Progress Report:     Date:  3/22/2021    Patient Name:  Yuri Self    :  2000  MRN: [id number]  Restrictions/Precautions:    Medical/Treatment Diagnosis Information:  · Diagnosis: concussion  · Treatment Diagnosis: concussion E70.7J6M  Insurance/Certification information:  PT Insurance Information: Aetna/AG/MIami  Physician Information:  Referring Practitioner: Dr Bill Ken of care signed (Y/N):     Date of Patient follow up with Physician:      Progress Report: [x]  Yes  []  No     Date Range for reporting period:  Beginning: 3/10/2021  Ending: -    Progress report due (10 Rx/or 30 days whichever is less):      Recertification due (POC duration/ or 90 days whichever is less):4/10/2021     Visit # Insurance Allowable Auth Needed   4 Aetna/AG/[place] []Yes    [x]No     Pain level:  5/10 neck    SUBJECTIVE:  Patient reports that she has been feeling really good until playing game on Saturday. Did not sustain a concussion ; however another player ran into her and jolted her neck and neck has been more painful since then. Did have a bit of a HA with this- but felt like it was related to neck. Is suppose to have a game later today. Running a few minutes late today.        OBJECTIVE: See eval   Observation:    Test measurements:      RESTRICTIONS/PRECAUTIONS: none    INTERVENTIONS:    Exercise/Equipment Resistance/Repetitions Symptoms and duration for Resolution   Cervical Interventions     Stretching            Upper trap            Levator     Strengthening            Cervical retractions            Rows            Shoulder extensions            Flexion            Extension            Right Rotation            Left Rotation            Right Side Flexion            Left Side Flexion          Vestibular Interventions: 0 min          Biodex 1 min duration x 3 reps: Level 9 Random control -med [place]- med speed (95%, 90%, 92% )  Maze control- medium (76%,82%, 74%)  Limits of stability -easy (54%, 72%, 61%)   BOSU          Oculomotor Interventions: 00 min     Smooth Pursuits X 20  Hoirz, vertical, diagonal   Gaze Stabilization X 20 horiz and vertical   Saccades-Horizontal X 20     Saccades-Vertical X 20    Saccades- Diagonal X 20    Ball toss with gaze stab 2 x 20    Ball toss with eye tracking 2 x 20    Walking head turns and nod  x 20    Scanning environment X 10           Therapeutic Ex: 20 Sets/sec Reps Notes   UBE 1 5    T- band Row/pinch 2 10 blue   T- band lower pinch 2 10 blue   T- band ER activation      UT stretch      Levator stretch      Isometric at wall      Quadruped w cerv retract      Front plank      Side plank      Chin tuck      Cat camel stretch 1 10    Quadruped t/s rotation 2 10 bilat   1st rib stretch/mob 2 10 Multi angle   Half kneel open book t/s stretch 2 10 bilat   Manual Intervention: 25 min      Cerv mobs/manip 1 8 R side opening, grade III   Thoracic mobs/manip 1 4 Supine and prone   CT manip 1 4 seated   Rib mobilizations 1 4 R 1st rib   STM 1 5 R paraspinals and UT   DN                Therapeutic Exercise and NMR EXR  [x] (24554) Provided verbal/tactile cueing for activities related to strengthening, flexibility, endurance, ROM  for improvements in cervical, postural, scapular, scapulothoracic and UE control with self care, reaching, carrying, lifting, house/yardwork, driving/computer work. [x] (47124) Provided verbal/tactile cueing for activities related to improving balance, coordination, kinesthetic sense, posture, motor skill, proprioception  to assist with cervical, scapular, scapulothoracic and UE control with self care, reaching, carrying, lifting, house/yardwork, driving/computer work.     Therapeutic Activities:    [] (46527 or 99698) Provided verbal/tactile cueing for Code Time in Time out                                     GOALS:  Patient stated goal: return to playing  [] Progressing: [] Met: [] Not Met: [] Adjusted    Therapist goals for Patient:   Short Term Goals: To be achieved in: 2 weeks  1. Independent in HEP and progression per patient tolerance, in order to prevent re-injury. [] Progressing: [] Met: [] Not Met: [] Adjusted  2. Patient will have a decrease in pain and concussion symptoms to facilitate improvement in movement, function, and ADLs as indicated by Functional Deficits. [] Progressing: [] Met: [] Not Met: [] Adjusted    Long Term Goals: To be achieved in: 4 weeks  1. Disability index score of 10% or less for the NDI to assist with reaching prior level of function. [] Progressing: [] Met: [] Not Met: [] Adjusted  2. Patient will demonstrate increased AROM to [facility] of cervical/thoracic spine to allow for proper joint functioning as indicated by patients Functional Deficits. [] Progressing: [] Met: [] Not Met: [] Adjusted  3. Patient will demonstrate an increase in postural awareness and control and activation of  Deep cervical stabilizers to allow for proper functional mobility as indicated by patients Functional Deficits. [] Progressing: [] Met: [] Not Met: [] Adjusted  4. Patient will return to school and sport activities without increased symptoms or restriction. [] Progressing: [] Met: [] Not Met: [] Adjusted  5. Patient to have normalized concussion symptoms to facilitate participation in movement, function, ADLs. [] Progressing: [] Met: [] Not Met: [] Adjusted   6. Patient will report no return of HA after practice. (patient specific functional goal)    [] Progressing: [] Met: [] Not Met: [] Adjusted     ASSESSMENT:  Patient very tight in R C6/7 and C7/T, 1st rib today with decreasing cervical restriction after manual therapy today. Tight in R thoracic spine as well which improved with manipulation.  Patient did well with stretching and

## 2021-03-24 ENCOUNTER — HOSPITAL ENCOUNTER (OUTPATIENT)
Dept: PHYSICAL THERAPY | Age: 21
Setting detail: THERAPIES SERIES
Discharge: HOME | End: 2021-03-24
Payer: COMMERCIAL

## 2021-03-24 PROCEDURE — 97110 THERAPEUTIC EXERCISES: CPT

## 2021-03-24 PROCEDURE — 97140 MANUAL THERAPY 1/> REGIONS: CPT

## 2021-03-24 NOTE — FLOWSHEET NOTE
[place] [address] [place] Richard Gandhi  Phone: [phone] Fax: [phone]    Physical Therapy Treatment Note/ Progress Report:     Date:  3/24/2021    Patient Name:  Molly Grider    :  2000  MRN: [id number]  Restrictions/Precautions:    Medical/Treatment Diagnosis Information:  · Diagnosis: concussion  · Treatment Diagnosis: concussion [id number]  Insurance/Certification information:  PT Insurance Information: Aetna/AG/MIami  Physician Information:  Referring Practitioner: Dr Osmel Perez of care signed (Y/N):     Date of Patient follow up with Physician:      Progress Report: [x]  Yes  []  No     Date Range for reporting period:  Beginning: 3/10/2021  Ending: -    Progress report due (10 Rx/or 30 days whichever is less): 4/15/9475     Recertification due (POC duration/ or 90 days whichever is less):4/10/2021     Visit # Insurance Allowable Auth Needed   5 Aetna/AG/[place] []Yes    [x]No     Pain level:  1/10 neck    SUBJECTIVE:  Patient reports that neck is feeling better since last session. Still has a little bit of a HA though. No increase in symptoms at practice.        OBJECTIVE: See eval   Observation:    Test measurements:      RESTRICTIONS/PRECAUTIONS: none    INTERVENTIONS:    Exercise/Equipment Resistance/Repetitions Symptoms and duration for Resolution   Cervical Interventions     Stretching            Upper trap            Levator     Strengthening            Cervical retractions            Rows            Shoulder extensions            Flexion            Extension            Right Rotation            Left Rotation            Right Side Flexion            Left Side Flexion          Vestibular Interventions: 0 min          Biodex 1 min duration x 3 reps: Level 9 Random control -med [place]- med speed (95%, 90%, 92% )  Maze control- medium (76%,82%, 74%)  Limits of stability -easy (54%, 72%, 61%)   SRIKANTH Oculomotor Interventions: 00 min     Smooth Pursuits X 20  Hoirz, vertical, diagonal   Gaze Stabilization X 20 horiz and vertical   Saccades-Horizontal X 20     Saccades-Vertical X 20    Saccades- Diagonal X 20    Ball toss with gaze stab 2 x 20    Ball toss with eye tracking 2 x 20    Walking head turns and nod  x 20    Scanning environment X 10           Therapeutic Ex: 20 Sets/sec Reps Notes   UBE 1 5    T- band Row/pinch 2 10 blue   T- band lower pinch 2 10 blue   T- band ER activation      UT stretch      Levator stretch      Isometric at wall      Quadruped w cerv retract      Front plank      Side plank      Chin tuck + rotation stretch for upper cervical 30 4 bilat   Cat camel stretch 1 10    Quadruped t/s rotation 2 10 bilat   1st rib stretch/mob   Multi angle   Half kneel open book t/s stretch 2 10 bilat   Manual Intervention: 25 min      Cerv mobs/manip 1 8 L side opening, grade III   Thoracic mobs/manip 1 4 Supine and prone   CT manip 1 3 seated   Rib mobilizations 1 2 L 1st rib   STM 1 5 L paraspinals and R UT   DN      c2 mobilization 3 10 L       Therapeutic Exercise and NMR EXR  [x] (42638) Provided verbal/tactile cueing for activities related to strengthening, flexibility, endurance, ROM  for improvements in cervical, postural, scapular, scapulothoracic and UE control with self care, reaching, carrying, lifting, house/yardwork, driving/computer work. [x] (00383) Provided verbal/tactile cueing for activities related to improving balance, coordination, kinesthetic sense, posture, motor skill, proprioception  to assist with cervical, scapular, scapulothoracic and UE control with self care, reaching, carrying, lifting, house/yardwork, driving/computer work.     Therapeutic Activities:    [] (90640 or 32878) Provided verbal/tactile cueing for activities related to improving balance, coordination, kinesthetic sense, posture, motor skill, proprioception and motor activation to allow for proper function of cervical, scapular, scapulothoracic and UE control with self care, carrying, lifting, driving/computer work.      Home Exercise Program:    [x] (42047) Reviewed/Progressed HEP activities related to strengthening, flexibility, endurance, ROM of cervical, scapular, scapulothoracic and UE control with self care, reaching, carrying, lifting, house/yardwork, driving/computer work  [] (63379) Reviewed/Progressed HEP activities related to improving balance, coordination, kinesthetic sense, posture, motor skill, proprioception of cervical, scapular, scapulothoracic and UE control with self care, reaching, carrying, lifting, house/yardwork, driving/computer work      Manual Treatments:  PROM / STM / Oscillations-Mobs:  G-I, II, III, IV (PA's, Inf., Post.)  [x] (49902) Provided manual therapy to mobilize soft tissue/joints of cervical/CT, scapular GHJ and UE for the purpose of decreasing headache, modulating pain, promoting relaxation,  increasing ROM, reducing/eliminating soft tissue swelling/inflammation/restriction, improving soft tissue extensibility and allowing for proper ROM for normal function with self care, reaching, carrying, lifting, house/yardwork, driving/computer work    Modalities:      Charges:  Timed Code Treatment Minutes: 45   Total Treatment Minutes: 45     [] EVAL (LOW) 57271 (typically 20 minutes face-to-face)  [] EVAL (MOD) 52260 (typically 30 minutes face-to-face)  [] EVAL (HIGH) 28872 (typically 45 minutes face-to-face)  [] RE-EVAL     [x] FT(75162) x  1   [] Dry needle 1 or 2 Muscles (10046)  [] NMR (88556) x     [] Dry needle 3+ Muscles (82268)  [x] Manual (45833) x  2     [] TA (41948) x     [] Mech Traction (00528)  [] ES(attended) (58635)     [] ES (un) (07900):   [] VASO (78447)  [] Other:      If [facility] Please Indicate Time In/Out  CPT Code Time in Time out                                     GOALS:  Patient stated goal: return to playing  [] Progressing: [] Met: [] Not Met: [] conclusion. Treatment/Activity Tolerance:  [x] Patient tolerated treatment well [] Patient limited by fatique  [] Patient limited by pain  [] Patient limited by other medical complications  [] Other:     Overall Progression Towards Functional goals/ Treatment Progress Update:  [x] Patient is progressing as expected towards functional goals listed. [] Progression is slowed due to complexities/Impairments listed. [] Progression has been slowed due to co-morbidities. [] Plan just implemented, too soon to assess goals progression <30days   [] Goals require adjustment due to lack of progress  [] Patient is not progressing as expected and requires additional follow up with physician  [] Other    Prognosis for POC: [x] Good [] Fair  [] Poor    Patient requires continued skilled intervention: [x] Yes  [] No        PLAN: Continue progressing cervical joint mobility, oculomotor and vestibular exercises and cervical strengthening exercises. [x] Continue per plan of care [] Alter current plan (see comments)  [] Plan of care initiated [] Hold pending MD visit [] Discharge    Electronically signed by: Stan Willis PT    Note: If patient does not return for scheduled/recommended follow up visits, this note will serve as a discharge from care along with the most recent update on progress.

## 2021-03-29 ENCOUNTER — HOSPITAL ENCOUNTER (OUTPATIENT)
Dept: PHYSICAL THERAPY | Age: 21
Setting detail: THERAPIES SERIES
Discharge: HOME | End: 2021-03-29
Payer: COMMERCIAL

## 2021-03-29 PROCEDURE — 97140 MANUAL THERAPY 1/> REGIONS: CPT

## 2021-03-29 PROCEDURE — 97112 NEUROMUSCULAR REEDUCATION: CPT

## 2021-03-29 PROCEDURE — 97110 THERAPEUTIC EXERCISES: CPT

## 2021-03-29 NOTE — FLOWSHEET NOTE
[facility] [address] [facility]  Phone: [phone] Fax: [phone]    Physical Therapy Treatment Note/ Progress Report:     Date:  3/29/2021    Patient Name:  Althea Godfrey    :  2000  MRN: [id number]  Restrictions/Precautions:    Medical/Treatment Diagnosis Information:  · Diagnosis: concussion  · Treatment Diagnosis: concussion [id number]  Insurance/Certification information:  PT Insurance Information: Aetna/AG/MIami  Physician Information:  Referring Practitioner: Dr Lewis Mins of care signed (Y/N):     Date of Patient follow up with Physician:      Progress Report: [x]  Yes  []  No     Date Range for reporting period:  Beginning: 3/10/2021  Ending: -    Progress report due (10 Rx/or 30 days whichever is less): 311     Recertification due (POC duration/ or 90 days whichever is less):4/10/2021     Visit # Insurance Allowable Auth Needed   6 Aetna/AG/[place] []Yes    [x]No     Pain level:  1/10 HA, 0/10 neck    SUBJECTIVE:  Patient reports that neck is feeling much better. Only a slight HA today. No issues over the weekend.        OBJECTIVE: See eval   Observation:    Test measurements:      RESTRICTIONS/PRECAUTIONS: none    INTERVENTIONS:    Exercise/Equipment Resistance/Repetitions Symptoms and duration for Resolution   Cervical Interventions     Stretching            Upper trap            Levator     Strengthening            Cervical retractions            Rows            Shoulder extensions            Flexion            Extension            Right Rotation            Left Rotation            Right Side Flexion            Left Side Flexion          Vestibular Interventions: 10 min          Biodex 1 min duration x 3 reps: Level 8 Random control -med [place]- med speed (87%, 81%, 82% )  Maze control- medium (80%,76%, 74%)  Limits of stability -easy (69%, 71%, 61%)   [facility]          Oculomotor Interventions: 00 min     Smooth Pursuits X 20  Hoirz, vertical, diagonal   Gaze Stabilization X 20 horiz and vertical   Saccades-Horizontal X 20     Saccades-Vertical X 20    Saccades- Diagonal X 20    Ball toss with gaze stab 2 x 20    Ball toss with eye tracking 2 x 20    Walking head turns and nod  x 20    Scanning environment X 10           Therapeutic Ex: 20 Sets/sec Reps Notes   UBE 1 5    T- band Row/pinch 2 10 blue   T- band lower pinch 2 10 blue   T- band ER activation      No money at wall- chin tuck focus 2 10 red   Lateral touch at wall 2 10 red   UT stretch      Levator stretch      Isometric at wall      Quadruped w cerv retract      Front plank      Side plank      Chin tuck 10 10    Chin tuck + rotation stretch for upper cervical 30 4 bilat   Cat camel stretch 0     Quadruped t/s rotation 0  bilat   1st rib stretch/mob   Multi angle   Half kneel open book t/s stretch 0  bilat   Manual Intervention: 17 min      Cerv mobs/manip 1 8 L side opening, grade III   Thoracic mobs/manip 1 4 Supine and prone   CT manip 1 3 prone   Rib mobilizations 1 2 L 1st rib   STM 1 0 L paraspinals and R UT   DN      c2 mobilization 0  L       Therapeutic Exercise and NMR EXR  [x] (19848) Provided verbal/tactile cueing for activities related to strengthening, flexibility, endurance, ROM  for improvements in cervical, postural, scapular, scapulothoracic and UE control with self care, reaching, carrying, lifting, house/yardwork, driving/computer work. [x] (89477) Provided verbal/tactile cueing for activities related to improving balance, coordination, kinesthetic sense, posture, motor skill, proprioception  to assist with cervical, scapular, scapulothoracic and UE control with self care, reaching, carrying, lifting, house/yardwork, driving/computer work.     Therapeutic Activities:    [] (65011 or 61458) Provided verbal/tactile cueing for activities related to improving balance, coordination, kinesthetic sense, posture, motor skill, proprioception and motor activation to allow for proper function of cervical, scapular, scapulothoracic and UE control with self care, carrying, lifting, driving/computer work.      Home Exercise Program:    [x] (42873) Reviewed/Progressed HEP activities related to strengthening, flexibility, endurance, ROM of cervical, scapular, scapulothoracic and UE control with self care, reaching, carrying, lifting, house/yardwork, driving/computer work  [] (69385) Reviewed/Progressed HEP activities related to improving balance, coordination, kinesthetic sense, posture, motor skill, proprioception of cervical, scapular, scapulothoracic and UE control with self care, reaching, carrying, lifting, house/yardwork, driving/computer work      Manual Treatments:  PROM / STM / Oscillations-Mobs:  G-I, II, III, IV (PA's, Inf., Post.)  [x] (66774) Provided manual therapy to mobilize soft tissue/joints of cervical/CT, scapular GHJ and UE for the purpose of decreasing headache, modulating pain, promoting relaxation,  increasing ROM, reducing/eliminating soft tissue swelling/inflammation/restriction, improving soft tissue extensibility and allowing for proper ROM for normal function with self care, reaching, carrying, lifting, house/yardwork, driving/computer work    Modalities:      Charges:  Timed Code Treatment Minutes: 47   Total Treatment Minutes: 47     [] EVAL (LOW) 55984 (typically 20 minutes face-to-face)  [] EVAL (MOD) 70133 (typically 30 minutes face-to-face)  [] EVAL (HIGH) 39828 (typically 45 minutes face-to-face)  [] RE-EVAL     [x] KK(06796) x  1   [] Dry needle 1 or 2 Muscles (89158)  [x] NMR (25043) x  1   [] Dry needle 3+ Muscles (49881)  [x] Manual (10081) x  1     [] TA (70689) x     [] Mech Traction (25401)  [] ES(attended) (58928)     [] ES (un) (60930):   [] VASO (39222)  [] Other:      If [facility] Please Indicate Time In/Out  CPT Code Time in Time out                                     GOALS:  Patient stated goal: return to playing  [] Progressing: [] Met: [] Not Met: [] Adjusted    Therapist goals for Patient:   Short Term Goals: To be achieved in: 2 weeks  1. Independent in HEP and progression per patient tolerance, in order to prevent re-injury. [] Progressing: [] Met: [] Not Met: [] Adjusted  2. Patient will have a decrease in pain and concussion symptoms to facilitate improvement in movement, function, and ADLs as indicated by Functional Deficits. [] Progressing: [] Met: [] Not Met: [] Adjusted    Long Term Goals: To be achieved in: 4 weeks  1. Disability index score of 10% or less for the NDI to assist with reaching prior level of function. [] Progressing: [] Met: [] Not Met: [] Adjusted  2. Patient will demonstrate increased AROM to [facility] of cervical/thoracic spine to allow for proper joint functioning as indicated by patients Functional Deficits. [] Progressing: [] Met: [] Not Met: [] Adjusted  3. Patient will demonstrate an increase in postural awareness and control and activation of  Deep cervical stabilizers to allow for proper functional mobility as indicated by patients Functional Deficits. [] Progressing: [] Met: [] Not Met: [] Adjusted  4. Patient will return to school and sport activities without increased symptoms or restriction. [] Progressing: [] Met: [] Not Met: [] Adjusted  5. Patient to have normalized concussion symptoms to facilitate participation in movement, function, ADLs. [] Progressing: [] Met: [] Not Met: [] Adjusted   6. Patient will report no return of HA after practice. (patient specific functional goal)    [] Progressing: [] Met: [] Not Met: [] Adjusted     ASSESSMENT:  Patient with continued improvement in restriction in  cervical spin. No restriction noted in R cervical spine; L upper cervical feeling improved and only restricted in L C4/5 and C5/6. Did well with mobilization and less restricted at conclusion.  Continued to work on DNF activation, periscapular activation and vestibular/oculomotor coordination. Feeling improved at conclusion of session. No HA at conclusion. Treatment/Activity Tolerance:  [x] Patient tolerated treatment well [] Patient limited by fatique  [] Patient limited by pain  [] Patient limited by other medical complications  [] Other:     Overall Progression Towards Functional goals/ Treatment Progress Update:  [x] Patient is progressing as expected towards functional goals listed. [] Progression is slowed due to complexities/Impairments listed. [] Progression has been slowed due to co-morbidities. [] Plan just implemented, too soon to assess goals progression <30days   [] Goals require adjustment due to lack of progress  [] Patient is not progressing as expected and requires additional follow up with physician  [] Other    Prognosis for POC: [x] Good [] Fair  [] Poor    Patient requires continued skilled intervention: [x] Yes  [] No        PLAN: Continue progressing cervical joint mobility, oculomotor and vestibular exercises and cervical strengthening exercises. [x] Continue per plan of care [] Alter current plan (see comments)  [] Plan of care initiated [] Hold pending MD visit [] Discharge    Electronically signed by: Evelyn Avila, PT    Note: If patient does not return for scheduled/recommended follow up visits, this note will serve as a discharge from care along with the most recent update on progress.

## 2021-04-01 ENCOUNTER — HOSPITAL ENCOUNTER (OUTPATIENT)
Dept: PHYSICAL THERAPY | Age: 21
Setting detail: THERAPIES SERIES
Discharge: HOME | End: 2021-04-01
Payer: COMMERCIAL

## 2021-04-01 PROCEDURE — 97140 MANUAL THERAPY 1/> REGIONS: CPT

## 2021-04-01 PROCEDURE — 97112 NEUROMUSCULAR REEDUCATION: CPT

## 2021-04-01 PROCEDURE — 97110 THERAPEUTIC EXERCISES: CPT

## 2021-04-01 NOTE — FLOWSHEET NOTE
(45%, 83%, 65%)   [facility]          Oculomotor Interventions: 00 min     Smooth Pursuits X 20  Hoirz, vertical, diagonal   Gaze Stabilization X 20 horiz and vertical   Saccades-Horizontal X 20     Saccades-Vertical X 20    Saccades- Diagonal X 20    Ball toss with gaze stab 2 x 20    Ball toss with eye tracking 2 x 20    Walking head turns and nod  x 20    Scanning environment X 10           Therapeutic Ex: 20 Sets/sec Reps Notes   UBE 1 5    T- band Row/pinch 2 10 blue   T- band lower pinch 2 10 blue   T- band ER activation      No money at wall- chin tuck focus 2 10 red   Lateral touch at wall 2 10 red   UT stretch      Levator stretch      Isometric at wall      Quadruped w cerv retract      Front plank      Side plank      Chin tuck 10 10    Chin tuck + rotation stretch for upper cervical 30 4 bilat   Cat camel stretch 0     Quadruped t/s rotation 0  bilat   1st rib stretch/mob   Multi angle   Half kneel open book t/s stretch 0  bilat   Manual Intervention: 14 min      Cerv mobs/manip 1 6 L side opening, grade III   Thoracic mobs/manip 1 3 Supine and prone   CT manip 1 3 prone   Rib mobilizations 1 2 L 1st rib   STM 1 0 L paraspinals and R UT   DN      c2 mobilization 0  L       Therapeutic Exercise and NMR EXR  [x] (25040) Provided verbal/tactile cueing for activities related to strengthening, flexibility, endurance, ROM  for improvements in cervical, postural, scapular, scapulothoracic and UE control with self care, reaching, carrying, lifting, house/yardwork, driving/computer work. [x] (64680) Provided verbal/tactile cueing for activities related to improving balance, coordination, kinesthetic sense, posture, motor skill, proprioception  to assist with cervical, scapular, scapulothoracic and UE control with self care, reaching, carrying, lifting, house/yardwork, driving/computer work.     Therapeutic Activities:    [] (85718 or 24405) Provided verbal/tactile cueing for activities related to improving balance, coordination, kinesthetic sense, posture, motor skill, proprioception and motor activation to allow for proper function of cervical, scapular, scapulothoracic and UE control with self care, carrying, lifting, driving/computer work.      Home Exercise Program:    [x] (28982) Reviewed/Progressed HEP activities related to strengthening, flexibility, endurance, ROM of cervical, scapular, scapulothoracic and UE control with self care, reaching, carrying, lifting, house/yardwork, driving/computer work  [] (51053) Reviewed/Progressed HEP activities related to improving balance, coordination, kinesthetic sense, posture, motor skill, proprioception of cervical, scapular, scapulothoracic and UE control with self care, reaching, carrying, lifting, house/yardwork, driving/computer work      Manual Treatments:  PROM / STM / Oscillations-Mobs:  G-I, II, III, IV (PA's, Inf., Post.)  [x] (97412) Provided manual therapy to mobilize soft tissue/joints of cervical/CT, scapular GHJ and UE for the purpose of decreasing headache, modulating pain, promoting relaxation,  increasing ROM, reducing/eliminating soft tissue swelling/inflammation/restriction, improving soft tissue extensibility and allowing for proper ROM for normal function with self care, reaching, carrying, lifting, house/yardwork, driving/computer work    Modalities:      Charges:  Timed Code Treatment Minutes: 44   Total Treatment Minutes: 44     [] EVAL (LOW) 26949 (typically 20 minutes face-to-face)  [] EVAL (MOD) 20400 (typically 30 minutes face-to-face)  [] EVAL (HIGH) 80970 (typically 45 minutes face-to-face)  [] RE-EVAL     [x] AH(90418) x  1   [] Dry needle 1 or 2 Muscles (71458)  [x] NMR (31095) x  1   [] Dry needle 3+ Muscles (93989)  [x] Manual (66817) x  1     [] TA (06250) x     [] [facility] T[facility] (15588)  [] ES(attended) (49888)     [] ES (un) (27279):   [] VASO (81936)  [] Other:      If [facility] Please Indicate Time In/Out  CPT Code Time in Time out GOALS:  Patient stated goal: return to playing  [] Progressing: [] Met: [] Not Met: [] Adjusted    Therapist goals for Patient:   Short Term Goals: To be achieved in: 2 weeks  1. Independent in HEP and progression per patient tolerance, in order to prevent re-injury. [] Progressing: [] Met: [] Not Met: [] Adjusted  2. Patient will have a decrease in pain and concussion symptoms to facilitate improvement in movement, function, and ADLs as indicated by Functional Deficits. [] Progressing: [] Met: [] Not Met: [] Adjusted    Long Term Goals: To be achieved in: 4 weeks  1. Disability index score of 10% or less for the NDI to assist with reaching prior level of function. [] Progressing: [] Met: [] Not Met: [] Adjusted  2. Patient will demonstrate increased AROM to [facility] of cervical/thoracic spine to allow for proper joint functioning as indicated by patients Functional Deficits. [] Progressing: [] Met: [] Not Met: [] Adjusted  3. Patient will demonstrate an increase in postural awareness and control and activation of  Deep cervical stabilizers to allow for proper functional mobility as indicated by patients Functional Deficits. [] Progressing: [] Met: [] Not Met: [] Adjusted  4. Patient will return to school and sport activities without increased symptoms or restriction. [] Progressing: [] Met: [] Not Met: [] Adjusted  5. Patient to have normalized concussion symptoms to facilitate participation in movement, function, ADLs. [] Progressing: [] Met: [] Not Met: [] Adjusted   6. Patient will report no return of HA after practice. (patient specific functional goal)    [] Progressing: [] Met: [] Not Met: [] Adjusted     ASSESSMENT:  Patient continues to demonstrate improvement in cervical joint mobility- still with mild restriction in L mid/ low cervical spine. Tolerated all manual therapy well. Continues to do well with all cervical and persicapular exercises.  Cues for decreased speed for further improved control. Oculomotor exercises doing very well. Ready to decrease frequency, will plan for 1x/week starting next week. Treatment/Activity Tolerance:  [x] Patient tolerated treatment well [] Patient limited by fatique  [] Patient limited by pain  [] Patient limited by other medical complications  [] Other:     Overall Progression Towards Functional goals/ Treatment Progress Update:  [x] Patient is progressing as expected towards functional goals listed. [] Progression is slowed due to complexities/Impairments listed. [] Progression has been slowed due to co-morbidities. [] Plan just implemented, too soon to assess goals progression <30days   [] Goals require adjustment due to lack of progress  [] Patient is not progressing as expected and requires additional follow up with physician  [] Other    Prognosis for POC: [x] Good [] Fair  [] Poor    Patient requires continued skilled intervention: [x] Yes  [] No        PLAN: Continue progressing cervical joint mobility, oculomotor and vestibular exercises and cervical strengthening exercises. [x] Continue per plan of care [] Alter current plan (see comments)  [] Plan of care initiated [] Hold pending MD visit [] Discharge    Electronically signed by: Tequila Byrd, PT    Note: If patient does not return for scheduled/recommended follow up visits, this note will serve as a discharge from care along with the most recent update on progress.

## 2021-04-07 ENCOUNTER — HOSPITAL ENCOUNTER (OUTPATIENT)
Dept: PHYSICAL THERAPY | Age: 21
Setting detail: THERAPIES SERIES
Discharge: HOME | End: 2021-04-07
Payer: COMMERCIAL

## 2021-04-07 PROCEDURE — 97110 THERAPEUTIC EXERCISES: CPT

## 2021-04-07 PROCEDURE — 97140 MANUAL THERAPY 1/> REGIONS: CPT

## 2021-04-07 NOTE — PLAN OF CARE
[place] [address]  Phone: [phone] Fax: [phone]       Physical Therapy Discharge Summary    Dear Referring Practitioner: Dr Alexandrea Moreau,    We had the pleasure of treating the following patient for physical therapy services at [address] [place] [address]. A summary of our findings can be found in the discharge summary below. If you have any questions or concerns regarding these findings, please do not hesitate to contact me at the office phone number above.   Thank you for the referral.     Physician Signature:________________________________Date:__________________  By signing above (or electronic signature), therapists plan is approved by physician      Overall Response to Treatment:   []Patient is responding well to treatment and improvement is noted with regards  to goals   [x]Patient should continue to improve in reasonable time if they continue HEP   []Patient has plateaued and is no longer responding to skilled PT intervention    []Patient is getting worse and would benefit from return to referring MD   []Patient unable to adhere to initial POC   []Other:     Date range of Visits: 3/10/2021 thru 2021  Total Visits: 8      Physical Therapy Treatment Note/ Progress Report:     Date:  2021    Patient Name:  Raymond Hanks    :  2000  MRN: [id number]  Restrictions/Precautions:    Medical/Treatment Diagnosis Information:  · Diagnosis: concussion  · Treatment Diagnosis: concussion T50.7K1F  Insurance/Certification information:  PT Insurance Information: [place]  Physician Information:  Referring Practitioner: Dr Andrea Puga of care signed (Y/N):     Date of Patient follow up with Physician:      Progress Report: [x]  Yes  []  No     Date Range for reporting period:  Beginning: 3/10/2021  Endin2021    Progress report due (10 Rx/or 30 days whichever is less): 4/10/2021 Recertification due (POC duration/ or 90 days whichever is less):4/10/2021     Visit # Insurance Allowable Auth Needed   7 Aetna/AG/[place] []Yes    [x]No     Pain level:  0/10    SUBJECTIVE:  Patient reports that she is feeling 100% now. No longer having any issues with neck or head. Feeling ready to d/c at this time.        OBJECTIVE:    Observation:    Test measurements:     Special Test Results Symptoms and Time until Resolution of Symptoms   Dizziness Handicap Inventory DHI 0% disability     BERTHA 0 errors level ground, 3 errors unlevel ground                           VOMS Not Tested Headache 0-10 Dizziness 0-10 Nausea 0-10 Fogginess 0-10 Comments   Baseline Symptoms:   0 0 0 0     Smooth Pursuits   0 0 0 0 Horizontal/vertical   Saccades-Horizontal      0 0 0 0     Saccades-Vertical   0 0 0 0         CERV ROM       Cervical Flexion 65     Cervical Extension 70     Cervical SB 60 60   Cervical rotation 85 85         RESTRICTIONS/PRECAUTIONS: none    INTERVENTIONS:    Exercise/Equipment Resistance/Repetitions Symptoms and duration for Resolution   Cervical Interventions     Stretching            Upper trap            Levator     Strengthening            Cervical retractions            Rows            Shoulder extensions            Flexion            Extension            Right Rotation            Left Rotation            Right Side Flexion            Left Side Flexion          Vestibular Interventions: 0 min          Biodex 1 min duration x 3 reps/ea: Level 8 Random control -med [place]- med speed (82%, 85%, 87% )  Maze control- medium (76%,70%, 72%)  Limits of stability -easy (45%, 83%, 65%)   BOSU          Oculomotor Interventions: 00 min     Smooth Pursuits X 20  Hoirz, vertical, diagonal   Gaze Stabilization X 20 horiz and vertical   Saccades-Horizontal X 20     Saccades-Vertical X 20    Saccades- Diagonal X 20    Ball toss with gaze stab 2 x 20    Ball toss with eye tracking 2 x 20    Walking head turns and nod  x 20    Scanning environment X 10           Therapeutic Ex: 30 Sets/sec Reps Notes   UBE 1 5    T- band Row/pinch 2 10 blue   T- band lower pinch 2 10 blue   T- band ER activation      No money at wall- chin tuck focus 2 10 orange   Lateral touch at wall 2 10 orange   Prone swiss ball T/Y 2 10 1 lb         Isometric at wall      Quadruped w cerv retract      Front plank      Side plank      Chin tuck 0     Chin tuck + rotation stretch for upper cervical 0  bilat   Cat camel stretch 0     Quadruped t/s rotation 0  bilat   1st rib stretch/mob   Multi angle   Half kneel open book t/s stretch 0  bilat   Manual Intervention: 10 min      Cerv mobs/manip 1 0 L side opening, grade III   Thoracic mobs/manip 1 0 Supine and prone   CT manip 1 0 prone   Rib mobilizations 1 0 L 1st rib   STM 1 10 B paraspinals and B UT   DN      c2 mobilization 0  L       Therapeutic Exercise and NMR EXR  [x] (85167) Provided verbal/tactile cueing for activities related to strengthening, flexibility, endurance, ROM  for improvements in cervical, postural, scapular, scapulothoracic and UE control with self care, reaching, carrying, lifting, house/yardwork, driving/computer work. [x] (78797) Provided verbal/tactile cueing for activities related to improving balance, coordination, kinesthetic sense, posture, motor skill, proprioception  to assist with cervical, scapular, scapulothoracic and UE control with self care, reaching, carrying, lifting, house/yardwork, driving/computer work. Therapeutic Activities:    [] (03019 or 90446) Provided verbal/tactile cueing for activities related to improving balance, coordination, kinesthetic sense, posture, motor skill, proprioception and motor activation to allow for proper function of cervical, scapular, scapulothoracic and UE control with self care, carrying, lifting, driving/computer work.      Home Exercise Program:    [x] (98111) Reviewed/Progressed HEP activities related to strengthening, flexibility, endurance, ROM of cervical, scapular, scapulothoracic and UE control with self care, reaching, carrying, lifting, house/yardwork, driving/computer work  [] (72482) Reviewed/Progressed HEP activities related to improving balance, coordination, kinesthetic sense, posture, motor skill, proprioception of cervical, scapular, scapulothoracic and UE control with self care, reaching, carrying, lifting, house/yardwork, driving/computer work      Manual Treatments:  PROM / STM / Oscillations-Mobs:  G-I, II, III, IV (PA's, Inf., Post.)  [x] (45566) Provided manual therapy to mobilize soft tissue/joints of cervical/CT, scapular GHJ and UE for the purpose of decreasing headache, modulating pain, promoting relaxation,  increasing ROM, reducing/eliminating soft tissue swelling/inflammation/restriction, improving soft tissue extensibility and allowing for proper ROM for normal function with self care, reaching, carrying, lifting, house/yardwork, driving/computer work    Modalities:      Charges:  Timed Code Treatment Minutes: 40   Total Treatment Minutes: 40     [] EVAL (LOW) 05464 (typically 20 minutes face-to-face)  [] EVAL (MOD) 81101 (typically 30 minutes face-to-face)  [] EVAL (HIGH) 19894 (typically 45 minutes face-to-face)  [] RE-EVAL     [x] QL(20629) x  2   [] Dry needle 1 or 2 Muscles (05599)  [] NMR (06652) x     [] Dry needle 3+ Muscles (95324)  [x] Manual (24522) x  1     [] TA (05787) x     [] Mech Traction (67245)  [] ES(attended) (44513)     [] ES (un) (79296):   [] VASO (38454)  [] Other:      If BW Please Indicate Time In/Out  CPT Code Time in Time out                                     GOALS:  Patient stated goal: return to playing  [] Progressing: [x] Met: [] Not Met: [] Adjusted    Therapist goals for Patient:   Short Term Goals: To be achieved in: 2 weeks  1. Independent in HEP and progression per patient tolerance, in order to prevent re-injury.    [] Progressing: [x] Met: [] Not Met: [] Adjusted  2. Patient will have a decrease in pain and concussion symptoms to facilitate improvement in movement, function, and ADLs as indicated by Functional Deficits. [] Progressing: [x] Met: [] Not Met: [] Adjusted    Long Term Goals: To be achieved in: 4 weeks  1. Disability index score of 10% or less for the NDI to assist with reaching prior level of function. [] Progressing: [x] Met: [] Not Met: [] Adjusted  2. Patient will demonstrate increased AROM to [facility] of cervical/thoracic spine to allow for proper joint functioning as indicated by patients Functional Deficits. [] Progressing: [x] Met: [] Not Met: [] Adjusted  3. Patient will demonstrate an increase in postural awareness and control and activation of  Deep cervical stabilizers to allow for proper functional mobility as indicated by patients Functional Deficits. [] Progressing: [x] Met: [] Not Met: [] Adjusted  4. Patient will return to school and sport activities without increased symptoms or restriction. [] Progressing: [x] Met: [] Not Met: [] Adjusted  5. Patient to have normalized concussion symptoms to facilitate participation in movement, function, ADLs. [] Progressing: [x] Met: [] Not Met: [] Adjusted   6. Patient will report no return of HA after practice. (patient specific functional goal)    [] Progressing: [x] Met: [] Not Met: [] Adjusted     ASSESSMENT:  Patient has been seen for 8 visits of physical therapy s/p concussion and neck pain. Patient has met all goals. Passed impact test approximately 2 weeks ago and returned to contact playing. No longer having any cervical mobility restriction or soft tissue restriction in neck. Oculomotor function and proprioception has returned to baseline. Patient ready for d/c from PT services at this time.        Treatment/Activity Tolerance:  [x] Patient tolerated treatment well [] Patient limited by fatique  [] Patient limited by pain  [] Patient limited by other medical complications  [] Other:

## 2021-08-11 ENCOUNTER — NURSE ONLY (OUTPATIENT)
Dept: CARDIOLOGY CLINIC | Age: 21
End: 2021-08-11
Payer: COMMERCIAL

## 2021-08-11 DIAGNOSIS — Z02.5 SPORTS PHYSICAL: Primary | ICD-10-CM

## 2021-08-11 PROCEDURE — 93000 ELECTROCARDIOGRAM COMPLETE: CPT | Performed by: INTERNAL MEDICINE

## 2021-09-02 ENCOUNTER — HOSPITAL ENCOUNTER (OUTPATIENT)
Dept: PHYSICAL THERAPY | Age: 21
Setting detail: THERAPIES SERIES
Discharge: HOME | End: 2021-09-02
Payer: COMMERCIAL

## 2021-09-02 PROCEDURE — 97161 PT EVAL LOW COMPLEX 20 MIN: CPT

## 2021-09-02 PROCEDURE — 97140 MANUAL THERAPY 1/> REGIONS: CPT

## 2021-09-02 NOTE — FLOWSHEET NOTE
[place]  [address]  Phone: [phone] Fax: [phone]    Physical Therapy Treatment Note/ Progress Report:     Date:  2021    Patient Name:  Cielo Cottrell    :  2000  MRN: [id number]  Restrictions/Precautions:    Medical/Treatment Diagnosis Information:  · Diagnosis: concussion, post concussion HA  · Treatment Diagnosis: concussion S06.0X0A, neck pain K22.8  Insurance/Certification information:  PT Insurance Information: Aetna/[place]  Physician Information:  Referring Practitioner: Dr Anjana Harrington of care signed (Y/N):     Date of Patient follow up with Physician:      Progress Report: [x]  Yes  []  No     Date Range for reporting period:  Beginnin2021  Ending: -    Progress report due (10 Rx/or 30 days whichever is less):      Recertification due (POC duration/ or 90 days whichever is less):10/15/2021     Visit # Insurance Allowable Auth Needed   1 Aetna/AG/[employer] []Yes    [x]No     Pain level:  5/10     SUBJECTIVE:  See eval    OBJECTIVE: See eval   Observation:    Test measurements:      RESTRICTIONS/PRECAUTIONS: none    INTERVENTIONS:    Exercise/Equipment Resistance/Repetitions Symptoms and duration for Resolution   Cervical Interventions     Stretching            Upper trap            Levator                    Strengthening            Cervical retractions            Rows            Shoulder extensions            Flexion            Extension            Right Rotation            Left Rotation            Right Side Flexion            Left Side Flexion          Vestibular Interventions            Nel-Hallpike            Habituation          Double Leg Standing     Tandem Standing     Single Leg Standing     Biodex     Airex     Rocker Board     BOSU     Balance Beam                         Oculomotor Interventions     Smooth Pursuits     Gaze Stabilization     Saccades-Horizontal Saccades-Vertical     VOR-Hoirzontal     VOR-Vertical     Convergence     Visual Motion Sensitivity  (VMS)                 Therapeutic Ex Sets/sec Reps Notes   UBE      T- band Row/pinch      T- band lower pinch      T- band ER activation      UT stretch      Levator stretch      Isometric at wall      Quadruped w cerv retract      Front plank      Side plank      Chin tuck      Chin tuck w lift      Chin tuck w rotation                  Manual Intervention 18 min      Cerv mobs/manip 1 6    Thoracic mobs/manip 1 3    CT manip 1 3    Rib mobilizations      STM 1 6    DN            NMR re-education      T-spine Ext- foam roll      Chin tucks       No money      Wall Postural re-ed                      Therapeutic Exercise and NMR EXR  [x] (63980) Provided verbal/tactile cueing for activities related to strengthening, flexibility, endurance, ROM  for improvements in cervical, postural, scapular, scapulothoracic and UE control with self care, reaching, carrying, lifting, house/yardwork, driving/computer work. [x] (98728) Provided verbal/tactile cueing for activities related to improving balance, coordination, kinesthetic sense, posture, motor skill, proprioception  to assist with cervical, scapular, scapulothoracic and UE control with self care, reaching, carrying, lifting, house/yardwork, driving/computer work. Therapeutic Activities:    [] (46151 or 94852) Provided verbal/tactile cueing for activities related to improving balance, coordination, kinesthetic sense, posture, motor skill, proprioception and motor activation to allow for proper function of cervical, scapular, scapulothoracic and UE control with self care, carrying, lifting, driving/computer work.      Home Exercise Program:    [x] (06443) Reviewed/Progressed HEP activities related to strengthening, flexibility, endurance, ROM of cervical, scapular, scapulothoracic and UE control with self care, reaching, carrying, lifting, house/yardwork, driving/computer work  [] (53998) Reviewed/Progressed HEP activities related to improving balance, coordination, kinesthetic sense, posture, motor skill, proprioception of cervical, scapular, scapulothoracic and UE control with self care, reaching, carrying, lifting, house/yardwork, driving/computer work      Manual Treatments:  PROM / STM / Oscillations-Mobs:  G-I, II, III, IV (PA's, Inf., Post.)  [x] (03100) Provided manual therapy to mobilize soft tissue/joints of cervical/CT, scapular GHJ and UE for the purpose of decreasing headache, modulating pain, promoting relaxation,  increasing ROM, reducing/eliminating soft tissue swelling/inflammation/restriction, improving soft tissue extensibility and allowing for proper ROM for normal function with self care, reaching, carrying, lifting, house/yardwork, driving/computer work    Modalities:      Charges:  Timed Code Treatment Minutes: 18   Total Treatment Minutes: 42     [x] EVAL (LOW) 63607 (typically 20 minutes face-to-face)  [] EVAL (MOD) 84789 (typically 30 minutes face-to-face)  [] EVAL (HIGH) 14164 (typically 45 minutes face-to-face)  [] RE-EVAL     [] WB(32122) x     [] Dry needle 1 or 2 Muscles (44470)  [] NMR (63364) x     [] Dry needle 3+ Muscles (11484)  [x] Manual (82186) x 1      [] TA (78904) x     [] Mech Traction (53388)  [] ES(attended) (70162)     [] ES (un) (92292):   [] VASO (25011)  [] Other:      If BW Please Indicate Time In/Out  CPT Code Time in Time out                                     GOALS:  Patient stated goal: return to playing field hockey  [] Progressing: [] Met: [] Not Met: [] Adjusted    Therapist goals for Patient:   Short Term Goals: To be achieved in: 2 weeks  1. Independent in HEP and progression per patient tolerance, in order to prevent re-injury. [] Progressing: [] Met: [] Not Met: [] Adjusted  2.  Patient will have a decrease in pain and concussion symptoms to facilitate improvement in movement, function, and ADLs as indicated by Functional Deficits. [] Progressing: [] Met: [] Not Met: [] Adjusted    Long Term Goals: To be achieved in: 6 weeks  1. Disability index score of 10% or less for the NDI to assist with reaching prior level of function. [] Progressing: [] Met: [] Not Met: [] Adjusted  2. Patient will demonstrate increased AROM to [facility] of cervical/thoracic spine to allow for proper joint functioning as indicated by patients Functional Deficits. [] Progressing: [] Met: [] Not Met: [] Adjusted  3. Patient will demonstrate an increase in postural awareness and control and activation of  Deep cervical stabilizers to allow for proper functional mobility as indicated by patients Functional Deficits. [] Progressing: [] Met: [] Not Met: [] Adjusted  4. Patient will return to running, jumping, cutting functional activities without increased symptoms or restriction. [] Progressing: [] Met: [] Not Met: [] Adjusted  5. Patient to have normalized concussion symptoms to facilitate participation in movement, function, ADLs. [] Progressing: [] Met: [] Not Met: [] Adjusted   6. Patient will report being able to return to all sport activities without increased symptoms or restriction. (patient specific functional goal)    [] Progressing: [] Met: [] Not Met: [] Adjusted     ASSESSMENT:  See eval    Treatment/Activity Tolerance:  [x] Patient tolerated treatment well [] Patient limited by fatique  [] Patient limited by pain  [] Patient limited by other medical complications  [] Other:     Overall Progression Towards Functional goals/ Treatment Progress Update:  [] Patient is progressing as expected towards functional goals listed. [] Progression is slowed due to complexities/Impairments listed. [] Progression has been slowed due to co-morbidities.   [x] Plan just implemented, too soon to assess goals progression <30days   [] Goals require adjustment due to lack of progress  [] Patient is not progressing as expected and requires additional follow up with physician  [] Other    Prognosis for POC: [x] Good [] Fair  [] Poor    Patient requires continued skilled intervention: [x] Yes  [] No        PLAN: See eval  [] Continue per plan of care [] Alter current plan (see comments)  [x] Plan of care initiated [] Hold pending MD visit [] Discharge    Electronically signed by: Burke Lay PT    Note: If patient does not return for scheduled/recommended follow up visits, this note will serve as a discharge from care along with the most recent update on progress.

## 2021-09-02 NOTE — PLAN OF CARE
[place] [address] [address]  Phone: [phone]  Fax:     [phone]         Physical Therapy Certification    Dear Referring Practitioner: Dr Stefanie Huggins,    We had the pleasure of evaluating the following patient for physical therapy services at [address] [place] [address]. A summary of our findings can be found in the initial assessment below. This includes our plan of care. If you have any questions or concerns regarding these findings, please do not hesitate to contact me at the office phone number checked above. Thank you for the referral.       Physician Signature:_______________________________Date:__________________  By signing above (or electronic signature), therapists plan is approved by physician            Patient: Odell Zarate   : 2000   MRN: [id number]  Referring Physician: Referring Practitioner: Dr Stefanie Huggins      Evaluation Date: 2021      Medical Diagnosis Information:  Diagnosis: concussion, post concussion HA   Treatment Diagnosis: concussion S06.0X0A, neck pain M54.2                                         Insurance information: PT Insurance Information: Aetna/AG/[place]    Precautions/ Contra-indications: none  Latex Allergy:  [x]NO      []YES  Preferred Language for Healthcare:   [x]English       []other:    C-SSRS Triggered by Intake questionnaire (Past 2 wk assessment ):   [x] No, Questionnaire did not trigger screening.   [] Yes, Patient intake triggered C-SSRS Screening      [] C-SSRS Screening completed  [] PCP notified via Epic     SUBJECTIVE: Patient stated complaint: Patient reports that she was at practice last Friday and went low to defend a ball and her teammate ran through her causing her head to whip back. Denies LOC or hitting ground after.  Reports that she initially had HA , dizziness and a little light and noise sensitivity, but no longer has any other concussion symptoms except HA. Took Impact test on Monday and did not do well in 1 segment of test (believes it was memory section). Will retest Impact next week. Planning to bike today.      Relevant Medical History:prior hx of concussion  Functional Scale/Score: DHI 18% , NDI 32% disability    Pain Scale: 5/10  Easing factors: rest  Provocative factors: using neck     Type: [x]Constant   []Intermittent  []Radiating []Localized []other:     Numbness/Tingling: none    Occupation/School:student at      Living Status/Prior Level of Function: Independent with ADLs and IADLs, plays D1 field hockey    OBJECTIVE:     Special Test Results Symptoms and Time until Resolution of Symptoms   Dizziness Handicap Inventory 18% disability    BERTHA 6 errors level ground  11 errors unlevel ground                  VOMS Not Tested Headache 0-10 Dizziness 0-10 Nausea 0-10 Fogginess 0-10 Comments   Baseline Symptoms:  5 0 0 0    Smooth Pursuits  5/6 0 0 0 Horizontal/vertical   Saccades-Horizontal    5 0 0 0    Saccades-Vertical  5 0 0 0    Convergence (near Point)  5 0 0 0 Near Point in cm  Measure 1:0  Measure 2:0  Measure 3:0   VOR-Horizontal         VOR-Vertical         Visual Motion Sensitivity                        CERV ROM     Cervical Flexion 60    Cervical Extension 45    Cervical SB 25 35   Cervical rotation 60 80        ROM Left Right   Shoulder Flex WNL WNL   Shoulder Abd WNL WNL   Shoulder ER WNL WNL   Shoulder IR WNL WNL                  Cervical Strength Right  Left   Flexion     Extension     Rotation     Lateral Flexion               Middle trap     Lower trap     Rhomboids             Special Test Right  Left   Spurlings     Sharp Pursor     VBI      Joint Mobility Limited in upper cervical Limited in lower cervical   General Myotomes testing (listed deficits)                   Reflexes Normal Abnormal Comments   [x]Reflexes equal and normal bilaterally      S1-2 Seated achilles [] []    S1-2 Prone knee bend [] []    L3-4 Patellar tendon [] []    C5-6 Biceps [] []    C6 Brachioradialis [] []    C7-8 Triceps [] []    Clonus [] []    Babinski [] []    Newsome's [] []      Dermatomes/Myotomes:   [x]Dermatomes/Myotomes intact    []Other:    Joint mobility: limited in R upper cervical and mid/lower cervical on L   []Normal    [x]Hypo   []Hyper    Palpation: tender along suboccipitals bilaterally, R upper cervical and mastoid, L paraspinals and into scalenes    Functional Mobility/Transfers: limited in playing field hockey, sleeping, turning head    Posture: mild forward head    Bandages/Dressings/Incisions: NA    Gait: (include devices/WB status): WNL     Orthopedic Special Tests: n/a                       [x] Patient history, allergies, meds reviewed. Medical chart reviewed. See intake form. Review Of Systems (ROS):  [x]Performed Review of systems (Integumentary, CardioPulmonary, Neurological) by intake and observation. Intake form has been scanned into medical record. Patient has been instructed to contact their primary care physician regarding ROS issues if not already being addressed at this time.       Co-morbidities/Complexities (which will affect course of rehabilitation):   [x]None           Arthritic conditions   []Rheumatoid arthritis (M05.9)  []Osteoarthritis (M19.91)   Cardiovascular conditions   []Hypertension (I10)  []Hyperlipidemia (E78.5)  []Angina pectoris (I20)  []Atherosclerosis (I70)  []CVA Musculoskeletal conditions   []Disc pathology   []Congenital spine pathologies   []Prior surgical intervention  []Osteoporosis (M81.8)  []Osteopenia (M85.8)   Endocrine conditions   []Hypothyroid (E03.9)  []Hyperthyroid Gastrointestinal conditions   []Constipation (M58.82)   Metabolic conditions   []Morbid obesity (E66.01)  []Diabetes type 1(E10.65) or 2 (E11.65)   []Neuropathy (G60.9)     Pulmonary conditions   []Asthma (J45)  []Coughing   []COPD (J44.9)   Psychological Disorders  []Anxiety (F41.9)  []Depression (F32.9)   []Other:   []Other:          Barriers to/and or personal factors that will affect rehab potential:              []Age  []Sex   []Smoker              []Motivation/Lack of Motivation                        []Co-Morbidities              []Cognitive Function, education/learning barriers              []Environmental, home barriers              []profession/work barriers  []past PT/medical experience  []other:  Justification:     Falls Risk Assessment (30 days):   [x] Falls Risk assessed and no intervention required. [] Falls Risk assessed and Patient requires intervention due to being higher risk   TUG score (>12s at risk):     [] Falls education provided, including         ASSESSMENT: Patient is a 22 yo female who presents to therapy s/p concussion with persistent HA. Patient has prior hx of 3 concussions. Upon assessment, patient with good tolerance to all smooth pursuit and saccades with no visible nystagmus noted. Patient with decreased cervical ROM, joint mobility, as well as increased soft tissue restriction along R and L cervical spine. Patient with poor score on BERTHA test with a total of 17 errors and baseline is 3 errors total. Patient tolerated all manual therapy well and will benefit from further skilled PT services to address noted deficits.       Functional Impairments:     [x]Noted cervical/thoracic/GHJ joint hypomobility   []Noted cervical/thoracic/GHJ joint hypermobility   [x]Decreased cervical/UE functional ROM   []Noted Headache pain aggravated by neck movements with/without dizziness   []Abnormal reflexes/sensation/myotomal/dermatomal deficits   [x]Decreased DCF control or ability to hold head up   [x]Decreased RC/scapular/core strength and neuromuscular control    []Decreased UE functional strength   []other:      Functional Activity Limitations (from functional questionnaire and intake)   []Reduced ability to tolerate prolonged functional positions   []Reduced ability or difficulty with changes of positions or transfers between positions   []Reduced ability to maintain good posture and demonstrate good body mechanics with sitting, bending, and lifting   [] Reduced ability or tolerance with driving and/or computer work   []Reduced ability to perform lifting, reaching, carrying tasks   [x]Reduced ability to concentrate   [x]Reduced ability to sleep    []Reduced ability to tolerate any impact through UE or spine   []Reduced ability to ambulate prolonged functional periods/distances   [x]other:play field hockey and run    Participation Restrictions   []Reduced participation in self care activities   []Reduced participation in home management activities   []Reduced participation in work activities   [x]Reduced participation in social activities. [x]Reduced participation in sport/recreational activities. Classification/Subgrouping:   [x]signs/symptoms consistent with neck pain with mobility deficits     [x]signs/symptoms consistent with neck pain with movement coordinated impairments    []signs/symptoms consistent with neck pain with radiating pain    [x]signs/symptoms consistent with neck pain with headaches (cervicogenic)    []Signs/symptoms consistent with nerve root involvement including myotome & dermatome dysfunction   []sign/symptoms consistent with facet dysfunction of cervical and thoracic spine    []signs/symptoms consistent suggesting central cord compression/UMN syndromes   []signs/symptoms consistent with discogenic cervical pain   []signs/symptoms consistent with rib dysfunction   []signs/symptoms consistent with postural dysfunction   []signs/symptoms consistent with shoulder pathology    []signs/symptoms consistent with post-surgical status including decreased ROM, strength and function.    []signs/symptoms consistent with pathology which may benefit from Dry Needling   []signs/symptoms which may limit the use of advanced manual therapy techniques: (Elevated CV risk profile, recent trauma, intolerance to end range positions, prior TIA, visual issues, UE neurological compromise )     Prognosis/Rehab Potential:      []Excellent   [x]Good    []Fair   []Poor    Tolerance of evaluation/treatment:    []Excellent   [x]Good    []Fair   []Poor    Physical Therapy Evaluation Complexity Justification  [x] A history of present problem with:  [x] no personal factors and/or comorbidities that impact the plan of care;  []1-2 personal factors and/or comorbidities that impact the plan of care  []3 personal factors and/or comorbidities that impact the plan of care  [x] An examination of body systems using standardized tests and measures addressing any of the following: body structures and functions (impairments), activity limitations, and/or participation restrictions;:  [x] a total of 1-2 or more elements   [] a total of 3 or more elements   [] a total of 4 or more elements   [x] A clinical presentation with:  [x] stable and/or uncomplicated characteristics   [] evolving clinical presentation with changing characteristics  [] unstable and unpredictable characteristics;   [x] Clinical decision making of [x] low, [] moderate, [] high complexity using standardized patient assessment instrument and/or measurable assessment of functional outcome. [x] EVAL (LOW) 11962 (typically 20 minutes face-to-face)  [] EVAL (MOD) 37143 (typically 30 minutes face-to-face)  [] EVAL (HIGH) 04440 (typically 45 minutes face-to-face)  [] RE-EVAL     PLAN:   Frequency/Duration:  1-2 days per week for 6 Weeks:  Interventions:  [x]  Therapeutic exercise including: strength training, ROM, for cervical spine,scapula, core and Upper extremity, including postural re-education. [x]  NMR activation and proprioception for Deep cervical flexors, periscapular and RC muscles and Core, including postural re-education.     [x]  Manual therapy as indicated for C/T spine, ribs, Soft tissue to include: Dry Needling/IASTM, STM, PROM, Gr I-IV mobilizations, manipulation. [x] Modalities as needed that may include: thermal agents, E-stim, Biofeedback, US, iontophoresis as indicated  [x] Patient education on joint protection, postural re-education, activity modification, progression of HEP. HEP instruction: t/spine ext (see scanned forms)    GOALS:  Patient stated goal: return to playing field hockey  [] Progressing: [] Met: [] Not Met: [] Adjusted    Therapist goals for Patient:   Short Term Goals: To be achieved in: 2 weeks  1. Independent in HEP and progression per patient tolerance, in order to prevent re-injury. [] Progressing: [] Met: [] Not Met: [] Adjusted  2. Patient will have a decrease in pain and concussion symptoms to facilitate improvement in movement, function, and ADLs as indicated by Functional Deficits. [] Progressing: [] Met: [] Not Met: [] Adjusted    Long Term Goals: To be achieved in: 6 weeks  1. Disability index score of 10% or less for the NDI to assist with reaching prior level of function. [] Progressing: [] Met: [] Not Met: [] Adjusted  2. Patient will demonstrate increased AROM to [facility] of cervical/thoracic spine to allow for proper joint functioning as indicated by patients Functional Deficits. [] Progressing: [] Met: [] Not Met: [] Adjusted  3. Patient will demonstrate an increase in postural awareness and control and activation of  Deep cervical stabilizers to allow for proper functional mobility as indicated by patients Functional Deficits. [] Progressing: [] Met: [] Not Met: [] Adjusted  4. Patient will return to running, jumping, cutting functional activities without increased symptoms or restriction. [] Progressing: [] Met: [] Not Met: [] Adjusted  5. Patient to have normalized concussion symptoms to facilitate participation in movement, function, ADLs. [] Progressing: [] Met: [] Not Met: [] Adjusted   6.  Patient will report being able to return to all sport activities without increased symptoms or restriction. (patient specific functional goal)    [] Progressing: [] Met: [] Not Met: [] Adjusted     Electronically signed by:  Erica Joy PT

## 2021-09-08 ENCOUNTER — HOSPITAL ENCOUNTER (OUTPATIENT)
Dept: PHYSICAL THERAPY | Age: 21
Setting detail: THERAPIES SERIES
Discharge: HOME | End: 2021-09-08
Payer: COMMERCIAL

## 2021-09-08 PROCEDURE — 97112 NEUROMUSCULAR REEDUCATION: CPT

## 2021-09-08 PROCEDURE — 97110 THERAPEUTIC EXERCISES: CPT

## 2021-09-08 PROCEDURE — 97140 MANUAL THERAPY 1/> REGIONS: CPT

## 2021-09-08 NOTE — FLOWSHEET NOTE
[place] [address] [address]  Phone: [phone] Fax: [phone]    Physical Therapy Treatment Note/ Progress Report:     Date:  2021    Patient Name:  Sruthi Zayas    :  2000  MRN: [id number]  Restrictions/Precautions:    Medical/Treatment Diagnosis Information:  · Diagnosis: concussion, post concussion HA  · Treatment Diagnosis: concussion S06.0X0A, neck pain Q39.0  Insurance/Certification information:  PT Insurance Information: Aetna/AG/[place]  Physician Information:  Referring Practitioner: Dr Camilo Ortega of care signed (Y/N):     Date of Patient follow up with Physician:      Progress Report: [x]  Yes  []  No     Date Range for reporting period:  Beginnin2021  Ending: -    Progress report due (10 Rx/or 30 days whichever is less): 27/3/3792     Recertification due (POC duration/ or 90 days whichever is less):10/15/2021     Visit # Insurance Allowable Auth Needed   2 Aetna/AG/[place] []Yes    [x]No     Pain level:  2-3/10 HA     SUBJECTIVE:  Patient reports that she felt much better after last session. Did not have HA for a little bit. Notes that she took her impact test and did fine, so she was able to return to play over the weekend. Notes that she has a little HA today.      OBJECTIVE: See eval   Observation:    Test measurements:      RESTRICTIONS/PRECAUTIONS: none    INTERVENTIONS:    Exercise/Equipment Resistance/Repetitions Symptoms and duration for Resolution   Cervical Interventions     Stretching            Upper trap            Levator                    Strengthening            Cervical retractions            Rows            Shoulder extensions            Flexion            Extension            Right Rotation            Left Rotation            Right Side Flexion            Left Side Flexion          Vestibular Interventions            Nel-Hallpike            Habituation          Double Leg Standing Tandem Standing     Single Leg Standing     Biodex     Airex     Rocker Board     BOSU     Balance Beam                         Oculomotor Interventions     Smooth Pursuits     Gaze Stabilization     Saccades-Horizontal     Saccades-Vertical     VOR-Hoirzontal     VOR-Vertical     Convergence     Visual Motion Sensitivity  (VMS)                 Therapeutic Ex: 10 min Sets/sec Reps Notes   UBE 1 4    T- band Row/pinch 1 15 black   T- band lower pinch 1 15 black   T- band ER activation      UT stretch      Levator stretch      Isometric at wall      Quadruped w cerv retract      Front plank      Side plank      Chin tuck      Chin tuck w lift      Chin tuck w rotation                  Manual Intervention 18 min      Cerv mobs/manip 1 6 Mid and upper cervical manip   Thoracic mobs/manip 1 3    CT manip 1 3    Rib mobilizations      STM 1 6    DN            NMR re-education: 15 min      T-spine Ext- foam roll 10 10    Chin tucks  10 10    No money 2 10 red   Wall Postural re-ed with head rotation and ball 2 10 White ball                   Therapeutic Exercise and NMR EXR  [x] (38748) Provided verbal/tactile cueing for activities related to strengthening, flexibility, endurance, ROM  for improvements in cervical, postural, scapular, scapulothoracic and UE control with self care, reaching, carrying, lifting, house/yardwork, driving/computer work. [x] (19921) Provided verbal/tactile cueing for activities related to improving balance, coordination, kinesthetic sense, posture, motor skill, proprioception  to assist with cervical, scapular, scapulothoracic and UE control with self care, reaching, carrying, lifting, house/yardwork, driving/computer work.     Therapeutic Activities:    [] (98850 or 88903) Provided verbal/tactile cueing for activities related to improving balance, coordination, kinesthetic sense, posture, motor skill, proprioception and motor activation to allow for proper function of cervical, scapular, goals for Patient:   Short Term Goals: To be achieved in: 2 weeks  1. Independent in HEP and progression per patient tolerance, in order to prevent re-injury. [] Progressing: [] Met: [] Not Met: [] Adjusted  2. Patient will have a decrease in pain and concussion symptoms to facilitate improvement in movement, function, and ADLs as indicated by Functional Deficits. [] Progressing: [] Met: [] Not Met: [] Adjusted    Long Term Goals: To be achieved in: 6 weeks  1. Disability index score of 10% or less for the NDI to assist with reaching prior level of function. [] Progressing: [] Met: [] Not Met: [] Adjusted  2. Patient will demonstrate increased AROM to [facility] of cervical/thoracic spine to allow for proper joint functioning as indicated by patients Functional Deficits. [] Progressing: [] Met: [] Not Met: [] Adjusted  3. Patient will demonstrate an increase in postural awareness and control and activation of  Deep cervical stabilizers to allow for proper functional mobility as indicated by patients Functional Deficits. [] Progressing: [] Met: [] Not Met: [] Adjusted  4. Patient will return to running, jumping, cutting functional activities without increased symptoms or restriction. [] Progressing: [] Met: [] Not Met: [] Adjusted  5. Patient to have normalized concussion symptoms to facilitate participation in movement, function, ADLs. [] Progressing: [] Met: [] Not Met: [] Adjusted   6. Patient will report being able to return to all sport activities without increased symptoms or restriction. (patient specific functional goal)    [] Progressing: [] Met: [] Not Met: [] Adjusted     ASSESSMENT:  Patient with good improvement in cervical spine mobility with manipulation today. No HA at conclusion. Very fatigued in periscapular musculature.      Treatment/Activity Tolerance:  [x] Patient tolerated treatment well [] Patient limited by fatique  [] Patient limited by pain  [] Patient limited by other medical complications  [] Other:     Overall Progression Towards Functional goals/ Treatment Progress Update:  [] Patient is progressing as expected towards functional goals listed. [] Progression is slowed due to complexities/Impairments listed. [] Progression has been slowed due to co-morbidities. [x] Plan just implemented, too soon to assess goals progression <30days   [] Goals require adjustment due to lack of progress  [] Patient is not progressing as expected and requires additional follow up with physician  [] Other    Prognosis for POC: [x] Good [] Fair  [] Poor    Patient requires continued skilled intervention: [x] Yes  [] No        PLAN: See eval  [] Continue per plan of care [] Alter current plan (see comments)  [x] Plan of care initiated [] Hold pending MD visit [] Discharge    Electronically signed by: Litzy Wesley PT    Note: If patient does not return for scheduled/recommended follow up visits, this note will serve as a discharge from care along with the most recent update on progress.

## 2021-09-13 ENCOUNTER — HOSPITAL ENCOUNTER (OUTPATIENT)
Dept: PHYSICAL THERAPY | Age: 21
Setting detail: THERAPIES SERIES
Discharge: HOME | End: 2021-09-13
Payer: COMMERCIAL

## 2021-09-15 ENCOUNTER — HOSPITAL ENCOUNTER (OUTPATIENT)
Dept: PHYSICAL THERAPY | Age: 21
Setting detail: THERAPIES SERIES
Discharge: HOME | End: 2021-09-15
Payer: COMMERCIAL

## 2021-09-15 PROCEDURE — 97110 THERAPEUTIC EXERCISES: CPT

## 2021-09-15 PROCEDURE — 97140 MANUAL THERAPY 1/> REGIONS: CPT

## 2021-09-15 PROCEDURE — 97112 NEUROMUSCULAR REEDUCATION: CPT

## 2021-09-15 NOTE — FLOWSHEET NOTE
[facility] [address] [address] [address]  Phone: [phone] Fax: [phone]    Physical Therapy Treatment Note/ Progress Report:     Date:  9/15/2021    Patient Name:  Seven Lbelanc    :  2000  MRN: [id number]  Restrictions/Precautions:    Medical/Treatment Diagnosis Information:  · Diagnosis: concussion, post concussion HA  · Treatment Diagnosis: concussion S06.0X0A, neck pain W10.5  Insurance/Certification information:  PT Insurance Information: Aetna/AG/[place]  Physician Information:  Referring Practitioner: Dr Chandni Elena of care signed (Y/N):     Date of Patient follow up with Physician:      Progress Report: [x]  Yes  []  No     Date Range for reporting period:  Beginnin2021  Ending: -    Progress report due (10 Rx/or 30 days whichever is less):      Recertification due (POC duration/ or 90 days whichever is less):10/15/2021     Visit # Insurance Allowable Auth Needed   3 Aetna/AG/[place] []Yes    [x]No     Pain level:  0/10 HA, \"neck feeling sore\"     SUBJECTIVE:  Patient reports that she hasn't had any further HA since last session. Notes that her neck is just feeling sore on both sides. No issues playing over the weekend. Feeling between 80-90% improved since onset of therapy.       OBJECTIVE: See eval   Observation:    Test measurements:      RESTRICTIONS/PRECAUTIONS: none    INTERVENTIONS:      Therapeutic Ex: 10 min Sets/sec Reps Notes   UBE 1 4    T- band Row/pinch 1 15 black   T- band lower pinch 1 15 black   T- band ER activation      Lateral taps 1 15 green   Levator stretch      Isometric at wall      Quadruped w cerv retract      Front plank      Side plank      Chin tuck      Chin tuck w lift      Chin tuck w rotation                  Manual Intervention 21 min      Cerv mobs/manip 1 6 Mid and lower cervical   Thoracic mobs/manip 1 3    CT manip 1 3    Rib mobilizations      STM 1 6 bilateral   Manual UT stretch 30 3 bilateral         NMR re-education: 15 min      T-spine Ext- foam roll 10 10    Chin tucks  10 10    No money 2 10 red   Wall Postural re-ed with head rotation and ball 2 10 White ball                   Therapeutic Exercise and NMR EXR  [x] (57160) Provided verbal/tactile cueing for activities related to strengthening, flexibility, endurance, ROM  for improvements in cervical, postural, scapular, scapulothoracic and UE control with self care, reaching, carrying, lifting, house/yardwork, driving/computer work. [x] (57412) Provided verbal/tactile cueing for activities related to improving balance, coordination, kinesthetic sense, posture, motor skill, proprioception  to assist with cervical, scapular, scapulothoracic and UE control with self care, reaching, carrying, lifting, house/yardwork, driving/computer work. Therapeutic Activities:    [] (39587 or 28527) Provided verbal/tactile cueing for activities related to improving balance, coordination, kinesthetic sense, posture, motor skill, proprioception and motor activation to allow for proper function of cervical, scapular, scapulothoracic and UE control with self care, carrying, lifting, driving/computer work.      Home Exercise Program:    [x] (02492) Reviewed/Progressed HEP activities related to strengthening, flexibility, endurance, ROM of cervical, scapular, scapulothoracic and UE control with self care, reaching, carrying, lifting, house/yardwork, driving/computer work  [] (71380) Reviewed/Progressed HEP activities related to improving balance, coordination, kinesthetic sense, posture, motor skill, proprioception of cervical, scapular, scapulothoracic and UE control with self care, reaching, carrying, lifting, house/yardwork, driving/computer work      Manual Treatments:  PROM / STM / Oscillations-Mobs:  G-I, II, III, IV (PA's, Inf., Post.)  [x] (54735) Provided manual therapy to mobilize soft tissue/joints of cervical/CT, scapular GHJ and Patient will demonstrate an increase in postural awareness and control and activation of  Deep cervical stabilizers to allow for proper functional mobility as indicated by patients Functional Deficits. [] Progressing: [] Met: [] Not Met: [] Adjusted  4. Patient will return to running, jumping, cutting functional activities without increased symptoms or restriction. [] Progressing: [] Met: [] Not Met: [] Adjusted  5. Patient to have normalized concussion symptoms to facilitate participation in movement, function, ADLs. [] Progressing: [] Met: [] Not Met: [] Adjusted   6. Patient will report being able to return to all sport activities without increased symptoms or restriction. (patient specific functional goal)    [] Progressing: [] Met: [] Not Met: [] Adjusted     ASSESSMENT:  Patient with good improvement in cervical spine mobility. No further tightness or restriction in upper cervical spine. Still with a bit of limitation in mid to lower cervical spine and with increased soft tissue restriction in bilateral paraspinals. Difficulty with periscapular strengthening. Treatment/Activity Tolerance:  [x] Patient tolerated treatment well [] Patient limited by fatique  [] Patient limited by pain  [] Patient limited by other medical complications  [] Other:     Overall Progression Towards Functional goals/ Treatment Progress Update:  [] Patient is progressing as expected towards functional goals listed. [] Progression is slowed due to complexities/Impairments listed. [] Progression has been slowed due to co-morbidities.   [x] Plan just implemented, too soon to assess goals progression <30days   [] Goals require adjustment due to lack of progress  [] Patient is not progressing as expected and requires additional follow up with physician  [] Other    Prognosis for POC: [x] Good [] Fair  [] Poor    Patient requires continued skilled intervention: [x] Yes  [] No        PLAN: See eval  [] Continue per plan of care [] Alter current plan (see comments)  [x] Plan of care initiated [] Hold pending MD visit [] Discharge    Electronically signed by: Riki Early PT    Note: If patient does not return for scheduled/recommended follow up visits, this note will serve as a discharge from care along with the most recent update on progress.

## 2021-09-20 ENCOUNTER — HOSPITAL ENCOUNTER (OUTPATIENT)
Dept: PHYSICAL THERAPY | Age: 21
Setting detail: THERAPIES SERIES
Discharge: HOME | End: 2021-09-20
Payer: COMMERCIAL

## 2021-09-20 PROCEDURE — 97140 MANUAL THERAPY 1/> REGIONS: CPT

## 2021-09-20 PROCEDURE — 97110 THERAPEUTIC EXERCISES: CPT

## 2021-09-20 NOTE — FLOWSHEET NOTE
[place] [address] [address]  Phone: [phone] Fax: [phone]    Physical Therapy Treatment Note/ Progress Report:     Date:  2021    Patient Name:  Ludwig Diaz    :  2000  MRN: [id number]  Restrictions/Precautions:    Medical/Treatment Diagnosis Information:  · Diagnosis: concussion, post concussion HA  · Treatment Diagnosis: concussion S06.0X0A, neck pain F61.7  Insurance/Certification information:  PT Insurance Information: Aetna/[place]  Physician Information:  Referring Practitioner: Dr Paola Pride of care signed (Y/N):     Date of Patient follow up with Physician:      Progress Report: [x]  Yes  []  No     Date Range for reporting period:  Beginnin2021  Ending: -    Progress report due (10 Rx/or 30 days whichever is less): 3671     Recertification due (POC duration/ or 90 days whichever is less):10/15/2021     Visit # Insurance Allowable Auth Needed   4 Aetna/AG/[place] []Yes    [x]No     Pain level:  0/10     SUBJECTIVE:  Patient reports that she hasn't had any further HA since last session. Does note any soreness coming into therapy. Feels more tight than anything.         OBJECTIVE: See eval   Observation:    Test measurements:      RESTRICTIONS/PRECAUTIONS: none    INTERVENTIONS:      Therapeutic Ex: 25 min Sets/sec Reps Notes   UBE 1 4    T- band Row/pinch 1 15 Shaky, black   T- band lower pinch 1 15 Shaky, black   T- band ER activation      Lateral taps 1 15 green   No money 2 10 red   Isometric at wall      Quadruped w cerv retract + alt UE lifts 1 10 2 lb   Front plank      Side plank      Prone swiss ball row 1 15 3 lb, bilat- cues form   Prone swiss ball sh ext 1 15 3 lb, bilat, cues form   Prone swiss ball T  1 15 2 lb bilat, bilat, cues form    Prone swiss ball Y 1 15 1 lb, bilat, cues form          Manual Intervention 13 min      Cerv mobs/manip 1 4 Mid and lower cervical   Thoracic mobs/manip 1 3    CT manip 1 0    Rib mobilizations      STM 1 6 bilateral   Manual UT stretch 0  bilateral         NMR re-education: 4 min      T-spine Ext- foam roll 10 10    Chin tucks  0     No money      Wall Postural re-ed with head rotation and ball 2 10 White ball                   Therapeutic Exercise and NMR EXR  [x] (28720) Provided verbal/tactile cueing for activities related to strengthening, flexibility, endurance, ROM  for improvements in cervical, postural, scapular, scapulothoracic and UE control with self care, reaching, carrying, lifting, house/yardwork, driving/computer work. [x] (72432) Provided verbal/tactile cueing for activities related to improving balance, coordination, kinesthetic sense, posture, motor skill, proprioception  to assist with cervical, scapular, scapulothoracic and UE control with self care, reaching, carrying, lifting, house/yardwork, driving/computer work. Therapeutic Activities:    [] (65728 or 04305) Provided verbal/tactile cueing for activities related to improving balance, coordination, kinesthetic sense, posture, motor skill, proprioception and motor activation to allow for proper function of cervical, scapular, scapulothoracic and UE control with self care, carrying, lifting, driving/computer work.      Home Exercise Program:    [x] (15576) Reviewed/Progressed HEP activities related to strengthening, flexibility, endurance, ROM of cervical, scapular, scapulothoracic and UE control with self care, reaching, carrying, lifting, house/yardwork, driving/computer work  [] (35085) Reviewed/Progressed HEP activities related to improving balance, coordination, kinesthetic sense, posture, motor skill, proprioception of cervical, scapular, scapulothoracic and UE control with self care, reaching, carrying, lifting, house/yardwork, driving/computer work      Manual Treatments:  PROM / STM / Oscillations-Mobs:  G-I, II, III, IV (PA's, Inf., Post.)  [x] (63362) Provided manual therapy to mobilize soft tissue/joints of cervical/CT, scapular GHJ and UE for the purpose of decreasing headache, modulating pain, promoting relaxation,  increasing ROM, reducing/eliminating soft tissue swelling/inflammation/restriction, improving soft tissue extensibility and allowing for proper ROM for normal function with self care, reaching, carrying, lifting, house/yardwork, driving/computer work    Modalities:      Charges:  Timed Code Treatment Minutes: 42   Total Treatment Minutes: 42     [] EVAL (LOW) 30933 (typically 20 minutes face-to-face)  [] EVAL (MOD) 77601 (typically 30 minutes face-to-face)  [] EVAL (HIGH) 24085 (typically 45 minutes face-to-face)  [] RE-EVAL     [x] LG(77883) x  2   [] Dry needle 1 or 2 Muscles (13044)  [] NMR (36437) x     [] Dry needle 3+ Muscles (32424)  [x] Manual (85515) x 1      [] TA (04399) x     [] Mech Traction (31146)  [] ES(attended) (01056)     [] ES (un) (90113):   [] VASO (14416)  [] Other:      If BW Please Indicate Time In/Out  CPT Code Time in Time out                                     GOALS:  Patient stated goal: return to playing field hockey  [] Progressing: [] Met: [] Not Met: [] Adjusted    Therapist goals for Patient:   Short Term Goals: To be achieved in: 2 weeks  1. Independent in HEP and progression per patient tolerance, in order to prevent re-injury. [] Progressing: [] Met: [] Not Met: [] Adjusted  2. Patient will have a decrease in pain and concussion symptoms to facilitate improvement in movement, function, and ADLs as indicated by Functional Deficits. [] Progressing: [] Met: [] Not Met: [] Adjusted    Long Term Goals: To be achieved in: 6 weeks  1. Disability index score of 10% or less for the NDI to assist with reaching prior level of function. [] Progressing: [] Met: [] Not Met: [] Adjusted  2.  Patient will demonstrate increased AROM to [facility] of cervical/thoracic spine to allow for proper joint functioning as indicated by patients Functional Deficits. [] Progressing: [] Met: [] Not Met: [] Adjusted  3. Patient will demonstrate an increase in postural awareness and control and activation of  Deep cervical stabilizers to allow for proper functional mobility as indicated by patients Functional Deficits. [] Progressing: [] Met: [] Not Met: [] Adjusted  4. Patient will return to running, jumping, cutting functional activities without increased symptoms or restriction. [] Progressing: [] Met: [] Not Met: [] Adjusted  5. Patient to have normalized concussion symptoms to facilitate participation in movement, function, ADLs. [] Progressing: [] Met: [] Not Met: [] Adjusted   6. Patient will report being able to return to all sport activities without increased symptoms or restriction. (patient specific functional goal)    [] Progressing: [] Met: [] Not Met: [] Adjusted     ASSESSMENT:  Patient with good improvement in cervical spine mobility. Still with some restriction in mid cervical spine on L. No further tightness or restriction in upper cervical spine. Patient with good improvement in mobility and reduction in tightness after manipulation Patient did well with all strengthening, needs cues for form. Weak in periscapular musculature. Treatment/Activity Tolerance:  [x] Patient tolerated treatment well [] Patient limited by fatique  [] Patient limited by pain  [] Patient limited by other medical complications  [] Other:     Overall Progression Towards Functional goals/ Treatment Progress Update:  [] Patient is progressing as expected towards functional goals listed. [] Progression is slowed due to complexities/Impairments listed. [] Progression has been slowed due to co-morbidities.   [x] Plan just implemented, too soon to assess goals progression <30days   [] Goals require adjustment due to lack of progress  [] Patient is not progressing as expected and requires additional follow up with physician  [] Other    Prognosis for POC: [x] Good [] Fair  [] Poor    Patient requires continued skilled intervention: [x] Yes  [] No        PLAN: See eval  [] Continue per plan of care [] Alter current plan (see comments)  [x] Plan of care initiated [] Hold pending MD visit [] Discharge    Electronically signed by: Burke Lay PT    Note: If patient does not return for scheduled/recommended follow up visits, this note will serve as a discharge from care along with the most recent update on progress.

## 2021-09-22 ENCOUNTER — APPOINTMENT (OUTPATIENT)
Dept: PHYSICAL THERAPY | Age: 21
End: 2021-09-22
Payer: COMMERCIAL

## 2021-09-27 ENCOUNTER — HOSPITAL ENCOUNTER (OUTPATIENT)
Dept: PHYSICAL THERAPY | Age: 21
Setting detail: THERAPIES SERIES
Discharge: HOME | End: 2021-09-27
Payer: COMMERCIAL

## 2021-09-27 PROCEDURE — 97140 MANUAL THERAPY 1/> REGIONS: CPT

## 2021-09-27 PROCEDURE — 97110 THERAPEUTIC EXERCISES: CPT

## 2021-09-27 NOTE — FLOWSHEET NOTE
[facility] [address] [address] [address]  Phone: [phone] Fax: [phone]    Physical Therapy Treatment Note/ Progress Report:     Date:  2021    Patient Name:  Seven Leblanc    :  2000  MRN: [id number]  Restrictions/Precautions:    Medical/Treatment Diagnosis Information:  · Diagnosis: concussion, post concussion HA  · Treatment Diagnosis: concussion S06.0X0A, neck pain D30.3  Insurance/Certification information:  PT Insurance Information: Aetna/[place]  Physician Information:  Referring Practitioner: Dr Chandni Elena of care signed (Y/N):     Date of Patient follow up with Physician:      Progress Report: [x]  Yes  []  No     Date Range for reporting period:  Beginnin2021  Ending: -    Progress report due (10 Rx/or 30 days whichever is less):      Recertification due (POC duration/ or 90 days whichever is less):10/15/2021     Visit # Insurance Allowable Auth Needed   5 Aetna/AG/[place] []Yes    [x]No     Pain level:  0/10     SUBJECTIVE:  Patient reports that she continues to not have any HA. Denies tightness. Feeling like she is getting closer to being back to normal. A little sore in muscles in upper back after last session.         OBJECTIVE: See eval   Observation:    Test measurements:      RESTRICTIONS/PRECAUTIONS: none    INTERVENTIONS:      Therapeutic Ex: 20 min Sets/sec Reps Notes   UBE 1 4    T- band Row/pinch 1 15 Shaky, black   T- band lower pinch 1 15 Shaky, black   T- band ER activation      Lateral taps 1 15 green   No money 2 10 green   Isometric at wall      Quadruped w cerv retract + alt UE lifts 1 10 2 lb   Front plank      Side plank      Prone swiss ball row 1 15 3 lb, bilat- cues form   Prone swiss ball sh ext 1 15 3 lb, bilat, cues form   Prone swiss ball T  1 15 2 lb bilat, bilat, cues form    Prone swiss ball Y 1 15 1 lb, bilat, cues form    Touchdown at wall with kneeling on BOSU ball 1 15 red band   Manual Intervention 13 min      Cerv mobs/manip 1 4 Mid and lower cervical   Thoracic mobs/manip 1 3    CT manip 1 0    Rib mobilizations      STM 1 6 bilateral   Manual UT stretch 0  bilateral         NMR re-education:2 min      T-spine Ext- foam roll 10 10    Chin tucks  0     No money      Wall Postural re-ed with head rotation and ball 2 10 White ball                   Therapeutic Exercise and NMR EXR  [x] (34353) Provided verbal/tactile cueing for activities related to strengthening, flexibility, endurance, ROM  for improvements in cervical, postural, scapular, scapulothoracic and UE control with self care, reaching, carrying, lifting, house/yardwork, driving/computer work. [x] (97796) Provided verbal/tactile cueing for activities related to improving balance, coordination, kinesthetic sense, posture, motor skill, proprioception  to assist with cervical, scapular, scapulothoracic and UE control with self care, reaching, carrying, lifting, house/yardwork, driving/computer work. Therapeutic Activities:    [] (59556 or 01381) Provided verbal/tactile cueing for activities related to improving balance, coordination, kinesthetic sense, posture, motor skill, proprioception and motor activation to allow for proper function of cervical, scapular, scapulothoracic and UE control with self care, carrying, lifting, driving/computer work.      Home Exercise Program:    [x] (91156) Reviewed/Progressed HEP activities related to strengthening, flexibility, endurance, ROM of cervical, scapular, scapulothoracic and UE control with self care, reaching, carrying, lifting, house/yardwork, driving/computer work  [] (23018) Reviewed/Progressed HEP activities related to improving balance, coordination, kinesthetic sense, posture, motor skill, proprioception of cervical, scapular, scapulothoracic and UE control with self care, reaching, carrying, lifting, house/yardwork, driving/computer work      Manual Treatments: of cervical/thoracic spine to allow for proper joint functioning as indicated by patients Functional Deficits. [] Progressing: [] Met: [] Not Met: [] Adjusted  3. Patient will demonstrate an increase in postural awareness and control and activation of  Deep cervical stabilizers to allow for proper functional mobility as indicated by patients Functional Deficits. [] Progressing: [] Met: [] Not Met: [] Adjusted  4. Patient will return to running, jumping, cutting functional activities without increased symptoms or restriction. [] Progressing: [] Met: [] Not Met: [] Adjusted  5. Patient to have normalized concussion symptoms to facilitate participation in movement, function, ADLs. [] Progressing: [] Met: [] Not Met: [] Adjusted   6. Patient will report being able to return to all sport activities without increased symptoms or restriction. (patient specific functional goal)    [] Progressing: [] Met: [] Not Met: [] Adjusted     ASSESSMENT:  Patient with good improvement in cervical spine mobility. Still with some restriction in mid cervical spine on L. No further tightness or restriction in upper cervical spine. Patient with good improvement in mobility and reduction in tightness after manipulation Patient did well with all strengthening, needs cues for form. Weak in periscapular musculature. Will continue 1x week, will taper as appropriate. Treatment/Activity Tolerance:  [x] Patient tolerated treatment well [] Patient limited by fatique  [] Patient limited by pain  [] Patient limited by other medical complications  [] Other:     Overall Progression Towards Functional goals/ Treatment Progress Update:  [x] Patient is progressing as expected towards functional goals listed. [] Progression is slowed due to complexities/Impairments listed. [] Progression has been slowed due to co-morbidities.   [] Plan just implemented, too soon to assess goals progression <30days   [] Goals require adjustment due to lack of progress  [] Patient is not progressing as expected and requires additional follow up with physician  [] Other    Prognosis for POC: [x] Good [] Fair  [] Poor    Patient requires continued skilled intervention: [x] Yes  [] No        PLAN:   [x] Continue per plan of care [] Alter current plan (see comments)  [] Plan of care initiated [] Hold pending MD visit [] Discharge    Electronically signed by: Shellie Hodge PT    Note: If patient does not return for scheduled/recommended follow up visits, this note will serve as a discharge from care along with the most recent update on progress.

## 2021-10-05 ENCOUNTER — HOSPITAL ENCOUNTER (OUTPATIENT)
Dept: PHYSICAL THERAPY | Age: 21
Setting detail: THERAPIES SERIES
Discharge: HOME | End: 2021-10-05
Payer: COMMERCIAL

## 2021-10-05 PROCEDURE — 97110 THERAPEUTIC EXERCISES: CPT

## 2021-10-05 PROCEDURE — 97140 MANUAL THERAPY 1/> REGIONS: CPT

## 2021-10-05 NOTE — FLOWSHEET NOTE
wall with kneeling on BOSU ball 0  red band   Manual Intervention 30 min      Cerv mobs/manip 1 4 Mid and lower cervical   Thoracic mobs/manip 1 3    CT manip 1 4    Rib mobilizations  4    STM 1 10 bilateral   Manual UT stretch 0  bilateral   DN  5    NMR re-education:2 min      T-spine Ext- foam roll 10 10    Chin tucks  0     No money      Wall Postural re-ed with head rotation and ball 2 10 White ball                   Spoke with   regarding the use of Dry Needling     Dry needling manual therapy: consisted on the placement of 3 needles in the following muscles:  L suboccipitals. A 40 mm needle was inserted, piston, rotated, and coned to produce intramuscular mobilization. These techniques were used to restore functional range of motion, reduce muscle spasm and induce healing in the corresponding musculature. (99092)  Clean Technique was utilized today while applying Dry needling treatment. The treatment sites where cleaned with 70% solution of  isopropyl alcohol . The PT washed their hands and utilized treatment gloves along with hand  prior to inserting the needles. All needles where removed and discarded in the appropriate sharps container. MD has given verbal and/or written approval for this treatment. Attended low frequency (1-20Hz) electrical stimulation was utilized in conjunction with Dry Needling:  the Estim was manipulated between all above needles for a period of 0 min. at 0 volts. The low frequency electrical stimulation was used to help reduce muscle spasm and help to interrupt /[place] the pain cycle. (24003)       Therapeutic Exercise and NMR EXR  [x] (04474) Provided verbal/tactile cueing for activities related to strengthening, flexibility, endurance, ROM  for improvements in cervical, postural, scapular, scapulothoracic and UE control with self care, reaching, carrying, lifting, house/yardwork, driving/computer work.     [x] (16285) Provided verbal/tactile cueing for activities related to improving balance, coordination, kinesthetic sense, posture, motor skill, proprioception  to assist with cervical, scapular, scapulothoracic and UE control with self care, reaching, carrying, lifting, house/yardwork, driving/computer work. Therapeutic Activities:    [] (13723 or 58793) Provided verbal/tactile cueing for activities related to improving balance, coordination, kinesthetic sense, posture, motor skill, proprioception and motor activation to allow for proper function of cervical, scapular, scapulothoracic and UE control with self care, carrying, lifting, driving/computer work.      Home Exercise Program:    [x] (31071) Reviewed/Progressed HEP activities related to strengthening, flexibility, endurance, ROM of cervical, scapular, scapulothoracic and UE control with self care, reaching, carrying, lifting, house/yardwork, driving/computer work  [] (49577) Reviewed/Progressed HEP activities related to improving balance, coordination, kinesthetic sense, posture, motor skill, proprioception of cervical, scapular, scapulothoracic and UE control with self care, reaching, carrying, lifting, house/yardwork, driving/computer work      Manual Treatments:  PROM / STM / Oscillations-Mobs:  G-I, II, III, IV (PA's, Inf., Post.)  [x] (47833) Provided manual therapy to mobilize soft tissue/joints of cervical/CT, scapular GHJ and UE for the purpose of decreasing headache, modulating pain, promoting relaxation,  increasing ROM, reducing/eliminating soft tissue swelling/inflammation/restriction, improving soft tissue extensibility and allowing for proper ROM for normal function with self care, reaching, carrying, lifting, house/yardwork, driving/computer work    Modalities:      Charges:  Timed Code Treatment Minutes: 42   Total Treatment Minutes: 43     [] EVAL (LOW) 99600 (typically 20 minutes face-to-face)  [] EVAL (MOD) 90578 (typically 30 minutes face-to-face)  [] EVAL (HIGH) 321 1948 (typically 45 minutes face-to-face)  [] RE-EVAL     [x] AU(68485) x  1   [] Dry needle 1 or 2 Muscles (11089)  [] NMR (58661) x     [] Dry needle 3+ Muscles (29030)  [x] Manual (72057) x 2      [] TA (56241) x     [] Mech Traction (60423)  [] ES(attended) (51118)     [] ES (un) (00392):   [] VASO (71359)  [] Other:      If BWC Please Indicate Time In/Out  CPT Code Time in Time out                                     GOALS:  Patient stated goal: return to playing field hockey  [] Progressing: [] Met: [] Not Met: [] Adjusted    Therapist goals for Patient:   Short Term Goals: To be achieved in: 2 weeks  1. Independent in HEP and progression per patient tolerance, in order to prevent re-injury. [] Progressing: [] Met: [] Not Met: [] Adjusted  2. Patient will have a decrease in pain and concussion symptoms to facilitate improvement in movement, function, and ADLs as indicated by Functional Deficits. [] Progressing: [] Met: [] Not Met: [] Adjusted    Long Term Goals: To be achieved in: 6 weeks  1. Disability index score of 10% or less for the NDI to assist with reaching prior level of function. [] Progressing: [] Met: [] Not Met: [] Adjusted  2. Patient will demonstrate [facility] cervical/thoracic spine to allow for proper joint functioning as indicated by patients Functional Deficits. [] Progressing: [] Met: [] Not Met: [] Adjusted  3. Patient will demonstrate an increase in postural awareness and control and activation of  Deep cervical stabilizers to allow for proper functional mobility as indicated by patients Functional Deficits. [] Progressing: [] Met: [] Not Met: [] Adjusted  4. Patient will return to running, jumping, cutting functional activities without increased symptoms or restriction. [] Progressing: [] Met: [] Not Met: [] Adjusted  5. Patient to have normalized concussion symptoms to facilitate participation in movement, function, ADLs. [] Progressing: [] Met: [] Not Met: [] Adjusted   6. Patient will report being able to return to all sport activities without increased symptoms or restriction. (patient specific functional goal)    [] Progressing: [] Met: [] Not Met: [] Adjusted     ASSESSMENT:  Patient with good improvement in cervical spine mobility. Tolerated DN to suboccipitals with good reduction in HA. Patient with good improvement in mobility and reduction in tightness after manipulation Patient did well with all strengthening, needs cues for form. Weak in periscapular musculature. Will add additional visit this week as patient needs further hands on to ready for game on Friday. Treatment/Activity Tolerance:  [x] Patient tolerated treatment well [] Patient limited by fatique  [] Patient limited by pain  [] Patient limited by other medical complications  [] Other:     Overall Progression Towards Functional goals/ Treatment Progress Update:  [x] Patient is progressing as expected towards functional goals listed. [] Progression is slowed due to complexities/Impairments listed. [] Progression has been slowed due to co-morbidities. [] Plan just implemented, too soon to assess goals progression <30days   [] Goals require adjustment due to lack of progress  [] Patient is not progressing as expected and requires additional follow up with physician  [] Other    Prognosis for POC: [x] Good [] Fair  [] Poor    Patient requires continued skilled intervention: [x] Yes  [] No        PLAN:   [x] Continue per plan of care [] Alter current plan (see comments)  [] Plan of care initiated [] Hold pending MD visit [] Discharge    Electronically signed by: Bibiana Vincent PT    Note: If patient does not return for scheduled/recommended follow up visits, this note will serve as a discharge from care along with the most recent update on progress.

## 2021-10-08 ENCOUNTER — HOSPITAL ENCOUNTER (OUTPATIENT)
Dept: PHYSICAL THERAPY | Age: 21
Setting detail: THERAPIES SERIES
Discharge: HOME | End: 2021-10-08
Payer: COMMERCIAL

## 2021-10-08 PROCEDURE — 97140 MANUAL THERAPY 1/> REGIONS: CPT

## 2021-10-08 PROCEDURE — 97110 THERAPEUTIC EXERCISES: CPT

## 2021-10-08 NOTE — FLOWSHEET NOTE
[facility] [address] [address] [address]  Phone: [phone] Fax: [phone]    Physical Therapy Treatment Note/ Progress Report:     Date:  10/8/2021    Patient Name:  Jj Florentino    :  2000  MRN: [id number]  Restrictions/Precautions:    Medical/Treatment Diagnosis Information:  · Diagnosis: concussion, post concussion HA  · Treatment Diagnosis: concussion S06.0X0A, neck pain J82.9  Insurance/Certification information:  PT Insurance Information: [employer]/AG/[place]  Physician Information:  Referring Practitioner: Dr Bonilla Bank of care signed (Y/N):     Date of Patient follow up with Physician:      Progress Report: [x]  Yes  []  No     Date Range for reporting period:  Beginnin2021  Ending: -    Progress report due (10 Rx/or 30 days whichever is less):      Recertification due (POC duration/ or 90 days whichever is less):10/15/2021     Visit # Insurance Allowable Auth Needed   7 [employer]/AG/[place] []Yes    [x]No     Pain level:  5.5/10     SUBJECTIVE:  Patient reports that she took a hit and fell on her shoulder on Friday and her neck has been more sore and with HA since that time. Notes that she has had 2 games since then and her HA continues to be present.         OBJECTIVE: See eval   Observation: Tight in L suboccipitals    Test measurements:      RESTRICTIONS/PRECAUTIONS: none    INTERVENTIONS:      Therapeutic Ex: 10 min Sets/sec Reps Notes   UBE 1 4    T- band Row/pinch 1 15 Shaky, black   T- band lower pinch 1 15 Shaky, black   T- band ER activation      Lateral taps 0  green   No money 0  green   Isometric at wall      Quadruped w cerv retract + alt UE lifts 1 10 2 lb   Front plank      Side plank      Prone swiss ball row 0  3 lb, bilat- cues form   Prone swiss ball sh ext 0  3 lb, bilat, cues form   Prone swiss ball T  0  2 lb bilat, bilat, cues form    Prone swiss ball Y 0  1 lb, bilat, cues form    Touchdown at wall with kneeling on BOSU ball 0  red band   Manual Intervention 30 min      Cerv mobs/manip 1 4 Mid and lower cervical   Thoracic mobs/manip 1 3    CT manip 1 4    Rib mobilizations  4 L 1st rib   STM 1 15 bilateral   Manual UT stretch 0  bilateral   DN  5    NMR re-education: 5 min      T-spine Ext- foam roll 10 10    Chin tucks  10 10    No money      Wall Postural re-ed with head rotation and ball 2 10 White ball                   Spoke with   regarding the use of Dry Needling     Dry needling manual therapy: consisted on the placement of 3 needles in the following muscles:  R suboccipitals. A 40 mm needle was inserted, piston, rotated, and coned to produce intramuscular mobilization. These techniques were used to restore functional range of motion, reduce muscle spasm and induce healing in the corresponding musculature. (68800)  Clean Technique was utilized today while applying Dry needling treatment. The treatment sites where cleaned with 70% solution of  isopropyl alcohol . The PT washed their hands and utilized treatment gloves along with hand  prior to inserting the needles. All needles where removed and discarded in the appropriate sharps container. MD has given verbal and/or written approval for this treatment. Attended low frequency (1-20Hz) electrical stimulation was utilized in conjunction with Dry Needling:  the Estim was manipulated between all above needles for a period of 0 min. at 0 volts. The low frequency electrical stimulation was used to help reduce muscle spasm and help to interrupt /[place] the pain cycle. (01724)       Therapeutic Exercise and NMR EXR  [x] (63223) Provided verbal/tactile cueing for activities related to strengthening, flexibility, endurance, ROM  for improvements in cervical, postural, scapular, scapulothoracic and UE control with self care, reaching, carrying, lifting, house/yardwork, driving/computer work.     [x] (94769) Provided verbal/tactile cueing for activities related to improving balance, coordination, kinesthetic sense, posture, motor skill, proprioception  to assist with cervical, scapular, scapulothoracic and UE control with self care, reaching, carrying, lifting, house/yardwork, driving/computer work. Therapeutic Activities:    [] (93798 or 81285) Provided verbal/tactile cueing for activities related to improving balance, coordination, kinesthetic sense, posture, motor skill, proprioception and motor activation to allow for proper function of cervical, scapular, scapulothoracic and UE control with self care, carrying, lifting, driving/computer work.      Home Exercise Program:    [x] (68308) Reviewed/Progressed HEP activities related to strengthening, flexibility, endurance, ROM of cervical, scapular, scapulothoracic and UE control with self care, reaching, carrying, lifting, house/yardwork, driving/computer work  [] (84116) Reviewed/Progressed HEP activities related to improving balance, coordination, kinesthetic sense, posture, motor skill, proprioception of cervical, scapular, scapulothoracic and UE control with self care, reaching, carrying, lifting, house/yardwork, driving/computer work      Manual Treatments:  PROM / STM / Oscillations-Mobs:  G-I, II, III, IV (PA's, Inf., Post.)  [x] (83298) Provided manual therapy to mobilize soft tissue/joints of cervical/CT, scapular GHJ and UE for the purpose of decreasing headache, modulating pain, promoting relaxation,  increasing ROM, reducing/eliminating soft tissue swelling/inflammation/restriction, improving soft tissue extensibility and allowing for proper ROM for normal function with self care, reaching, carrying, lifting, house/yardwork, driving/computer work    Modalities:      Charges:  Timed Code Treatment Minutes: 45   Total Treatment Minutes: 45     [] EVAL (LOW) 93942 (typically 20 minutes face-to-face)  [] EVAL (MOD) 02611 (typically 30 minutes face-to-face)  [] EVAL (HIGH) 09617 (typically 45 minutes face-to-face)  [] RE-EVAL     [x] CC(67336) x  1   [] Dry needle 1 or 2 Muscles (24313)  [] NMR (96951) x     [] Dry needle 3+ Muscles (72916)  [x] Manual (55908) x 2      [] TA (57231) x     [] Mech Traction (92730)  [] ES(attended) (25263)     [] ES (un) (77328):   [] VASO (47417)  [] Other:      If BWC Please Indicate Time In/Out  CPT Code Time in Time out                                     GOALS:  Patient stated goal: return to playing field hockey  [] Progressing: [] Met: [] Not Met: [] Adjusted    Therapist goals for Patient:   Short Term Goals: To be achieved in: 2 weeks  1. Independent in HEP and progression per patient tolerance, in order to prevent re-injury. [] Progressing: [] Met: [] Not Met: [] Adjusted  2. Patient will have a decrease in pain and concussion symptoms to facilitate improvement in movement, function, and ADLs as indicated by Functional Deficits. [] Progressing: [] Met: [] Not Met: [] Adjusted    Long Term Goals: To be achieved in: 6 weeks  1. Disability index score of 10% or less for the NDI to assist with reaching prior level of function. [] Progressing: [] Met: [] Not Met: [] Adjusted  2. Patient will demonstrate increased AROM to [facility] of cervical/thoracic spine to allow for proper joint functioning as indicated by patients Functional Deficits. [] Progressing: [] Met: [] Not Met: [] Adjusted  3. Patient will demonstrate an increase in postural awareness and control and activation of  Deep cervical stabilizers to allow for proper functional mobility as indicated by patients Functional Deficits. [] Progressing: [] Met: [] Not Met: [] Adjusted  4. Patient will return to running, jumping, cutting functional activities without increased symptoms or restriction. [] Progressing: [] Met: [] Not Met: [] Adjusted  5. Patient to have normalized concussion symptoms to facilitate participation in movement, function, ADLs.    [] Progressing: [] Met: [] Not Met: [] Adjusted   6. Patient will report being able to return to all sport activities without increased symptoms or restriction. (patient specific functional goal)    [] Progressing: [] Met: [] Not Met: [] Adjusted     ASSESSMENT:  Patient with increased restriction in R suboccipitals and L cervical spine mobility and 1st rib. Patient  with good improvement in mobility and reduction in tightness after manipulation and DN to suboccipitals with improvement of pain to 3.5/10. Patient did well with all strengthening, needs cues for form. Weak in periscapular musculature. Treatment/Activity Tolerance:  [x] Patient tolerated treatment well [] Patient limited by fatique  [] Patient limited by pain  [] Patient limited by other medical complications  [] Other:     Overall Progression Towards Functional goals/ Treatment Progress Update:  [x] Patient is progressing as expected towards functional goals listed. [] Progression is slowed due to complexities/Impairments listed. [] Progression has been slowed due to co-morbidities. [] Plan just implemented, too soon to assess goals progression <30days   [] Goals require adjustment due to lack of progress  [] Patient is not progressing as expected and requires additional follow up with physician  [] Other    Prognosis for POC: [x] Good [] Fair  [] Poor    Patient requires continued skilled intervention: [x] Yes  [] No        PLAN:   [x] Continue per plan of care [] Alter current plan (see comments)  [] Plan of care initiated [] Hold pending MD visit [] Discharge    Electronically signed by: Angelica Tim PT    Note: If patient does not return for scheduled/recommended follow up visits, this note will serve as a discharge from care along with the most recent update on progress.

## 2021-10-11 ENCOUNTER — HOSPITAL ENCOUNTER (OUTPATIENT)
Dept: PHYSICAL THERAPY | Age: 21
Setting detail: THERAPIES SERIES
Discharge: HOME | End: 2021-10-11
Payer: COMMERCIAL

## 2021-10-11 PROCEDURE — 97140 MANUAL THERAPY 1/> REGIONS: CPT

## 2021-10-11 NOTE — FLOWSHEET NOTE
[facility] [address] [facility] [address]  Phone: [phone] Fax: [phone]    Physical Therapy Treatment Note/ Progress Report:     Date:  10/11/2021    Patient Name:  Gennaro Soulier    :  2000  MRN: [id number]  Restrictions/Precautions:    Medical/Treatment Diagnosis Information:  · Diagnosis: concussion, post concussion HA  · Treatment Diagnosis: concussion S06.0X0A, neck pain D93.4  Insurance/Certification information:  PT Insurance Information: Aetna/AG/[place]  Physician Information:  Referring Practitioner: Dr Chuck Westfall of care signed (Y/N):     Date of Patient follow up with Physician:      Progress Report: [x]  Yes  []  No     Date Range for reporting period:  Beginnin2021  Ending: -    Progress report due (10 Rx/or 30 days whichever is less):      Recertification due (POC duration/ or 90 days whichever is less):10/15/2021     Visit # Insurance Allowable Auth Needed   8 Aetna/AG/[place] []Yes    [x]No     Pain level:  5/10     SUBJECTIVE:  Patient reports that she felt good after PT on Friday and during the game. After the game, she didn't feel good and HA returned. Still feeling about the same today as she did at the end of the week last week.          OBJECTIVE: See eval   Observation: Tight in L suboccipitals    Test measurements:      RESTRICTIONS/PRECAUTIONS: none    INTERVENTIONS:      Therapeutic Ex: 4 min Sets/sec Reps Notes   UBE 1 4    T- band Row/pinch 1 15 Shaky, black   T- band lower pinch 1 15 Shaky, black   T- band ER activation      Lateral taps 0  green   No money 0  green   Isometric at wall      Quadruped w cerv retract + alt UE lifts 1 10 2 lb   Front plank      Side plank      Prone swiss ball row 0  3 lb, bilat- cues form   Prone swiss ball sh ext 0  3 lb, bilat, cues form   Prone swiss ball T  0  2 lb bilat, bilat, cues form    Prone swiss ball Y 0  1 lb, bilat, cues form    Touchdown at wall with kneeling on BOSU ball 0  red band   Manual Intervention 30 min      Cerv mobs/manip 1 4 Mid and lower cervical   Thoracic mobs/manip 1 3    CT manip 1 4    Rib mobilizations  4 L 1st rib   STM 1 15 bilateral   Manual UT stretch 0  bilateral   DN  0    NMR re-education: 0 min      T-spine Ext- foam roll 10 10    Chin tucks  10 10    No money      Wall Postural re-ed with head rotation and ball 2 10 White ball                         Therapeutic Exercise and NMR EXR  [x] (77003) Provided verbal/tactile cueing for activities related to strengthening, flexibility, endurance, ROM  for improvements in cervical, postural, scapular, scapulothoracic and UE control with self care, reaching, carrying, lifting, house/yardwork, driving/computer work. [x] (54361) Provided verbal/tactile cueing for activities related to improving balance, coordination, kinesthetic sense, posture, motor skill, proprioception  to assist with cervical, scapular, scapulothoracic and UE control with self care, reaching, carrying, lifting, house/yardwork, driving/computer work. Therapeutic Activities:    [] (49477 or 29948) Provided verbal/tactile cueing for activities related to improving balance, coordination, kinesthetic sense, posture, motor skill, proprioception and motor activation to allow for proper function of cervical, scapular, scapulothoracic and UE control with self care, carrying, lifting, driving/computer work.      Home Exercise Program:    [x] (02530) Reviewed/Progressed HEP activities related to strengthening, flexibility, endurance, ROM of cervical, scapular, scapulothoracic and UE control with self care, reaching, carrying, lifting, house/yardwork, driving/computer work  [] (25474) Reviewed/Progressed HEP activities related to improving balance, coordination, kinesthetic sense, posture, motor skill, proprioception of cervical, scapular, scapulothoracic and UE control with self care, reaching, carrying, lifting, house/yardwork, driving/computer work      Manual Treatments:  PROM / STM / Oscillations-Mobs:  G-I, II, III, IV (PA's, Inf., Post.)  [x] (77684) Provided manual therapy to mobilize soft tissue/joints of cervical/CT, scapular GHJ and UE for the purpose of decreasing headache, modulating pain, promoting relaxation,  increasing ROM, reducing/eliminating soft tissue swelling/inflammation/restriction, improving soft tissue extensibility and allowing for proper ROM for normal function with self care, reaching, carrying, lifting, house/yardwork, driving/computer work    Modalities:      Charges:  Timed Code Treatment Minutes: 34   Total Treatment Minutes: 35     [] EVAL (LOW) 35675 (typically 20 minutes face-to-face)  [] EVAL (MOD) 06740 (typically 30 minutes face-to-face)  [] EVAL (HIGH) 91431 (typically 45 minutes face-to-face)  [] RE-EVAL     [] SC(10031) x     [] Dry needle 1 or 2 Muscles (31968)  [] NMR (90204) x     [] Dry needle 3+ Muscles (39635)  [x] Manual (33387) x 2      [] TA (55752) x     [] Mech Traction (64741)  [] ES(attended) (34915)     [] ES (un) (87904):   [] VASO (22688)  [] Other:      If [facility] Please Indicate Time In/Out  CPT Code Time in Time out                                     GOALS:  Patient stated goal: return to playing field hockey  [] Progressing: [] Met: [] Not Met: [] Adjusted    Therapist goals for Patient:   Short Term Goals: To be achieved in: 2 weeks  1. Independent in HEP and progression per patient tolerance, in order to prevent re-injury. [] Progressing: [] Met: [] Not Met: [] Adjusted  2. Patient will have a decrease in pain and concussion symptoms to facilitate improvement in movement, function, and ADLs as indicated by Functional Deficits. [] Progressing: [] Met: [] Not Met: [] Adjusted    Long Term Goals: To be achieved in: 6 weeks  1. Disability index score of 10% or less for the NDI to assist with reaching prior level of function.    [] Progressing: [] Met: [] Not Met: [] Adjusted  2. Patient will demonstrate increased AROM to [facility] of cervical/thoracic spine to allow for proper joint functioning as indicated by patients Functional Deficits. [] Progressing: [] Met: [] Not Met: [] Adjusted  3. Patient will demonstrate an increase in postural awareness and control and activation of  Deep cervical stabilizers to allow for proper functional mobility as indicated by patients Functional Deficits. [] Progressing: [] Met: [] Not Met: [] Adjusted  4. Patient will return to running, jumping, cutting functional activities without increased symptoms or restriction. [] Progressing: [] Met: [] Not Met: [] Adjusted  5. Patient to have normalized concussion symptoms to facilitate participation in movement, function, ADLs. [] Progressing: [] Met: [] Not Met: [] Adjusted   6. Patient will report being able to return to all sport activities without increased symptoms or restriction. (patient specific functional goal)    [] Progressing: [] Met: [] Not Met: [] Adjusted     ASSESSMENT:  Patient with lessening restriction in R suboccipitals and L lower cervical spine mobility and 1st rib. Patient  with good improvement in mobility and reduction in tightness after manipulation and soft tissue mobilization. Patient reporting no HA at conclusion of session. Had to leave early for class today. Treatment/Activity Tolerance:  [x] Patient tolerated treatment well [] Patient limited by fatique  [] Patient limited by pain  [] Patient limited by other medical complications  [] Other:     Overall Progression Towards Functional goals/ Treatment Progress Update:  [x] Patient is progressing as expected towards functional goals listed. [] Progression is slowed due to complexities/Impairments listed. [] Progression has been slowed due to co-morbidities.   [] Plan just implemented, too soon to assess goals progression <30days   [] Goals require adjustment due to lack of progress  [] Patient is not progressing as expected and requires additional follow up with physician  [] Other    Prognosis for POC: [x] Good [] Fair  [] Poor    Patient requires continued skilled intervention: [x] Yes  [] No        PLAN:   [x] Continue per plan of care [] Alter current plan (see comments)  [] Plan of care initiated [] Hold pending MD visit [] Discharge    Electronically signed by: Adelia Hernandez PT    Note: If patient does not return for scheduled/recommended follow up visits, this note will serve as a discharge from care along with the most recent update on progress.

## 2021-10-13 ENCOUNTER — HOSPITAL ENCOUNTER (OUTPATIENT)
Dept: PHYSICAL THERAPY | Age: 21
Setting detail: THERAPIES SERIES
Discharge: HOME | End: 2021-10-13
Payer: COMMERCIAL

## 2021-10-13 PROCEDURE — 97110 THERAPEUTIC EXERCISES: CPT

## 2021-10-13 PROCEDURE — 97140 MANUAL THERAPY 1/> REGIONS: CPT

## 2021-10-13 NOTE — FLOWSHEET NOTE
[email or web address] [address] [address]  Phone: [phone] Fax: [phone]    Physical Therapy Treatment Note/ Progress Report:     Date:  10/13/2021    Patient Name:  Kip Shi    :  2000  MRN: [id number]  Restrictions/Precautions:    Medical/Treatment Diagnosis Information:  · Diagnosis: concussion, post concussion HA  · Treatment Diagnosis: concussion S06.0X0A, neck pain A97.3  Insurance/Certification information:  PT Insurance Information: Aetna/[place]  Physician Information:  Referring Practitioner: Dr Khadar France of [facility] signed (Y/N):     Date of Patient follow up with Physician:      Progress Report: [x]  Yes  []  No     Date Range for reporting period:  Beginnin2021  Ending: -    Progress report due (10 Rx/or 30 days whichever is less):      Recertification due (POC duration/ or 90 days whichever is less):10/15/2021     Visit # Insurance Allowable Auth Needed   9 Aetna/[place] []Yes    [x]No     Pain level:  4/10     SUBJECTIVE:  Patient reports that she felt good after last PT and didn't have a HA yesterday and everything felt pretty normal. States that she has a little bit of a HA today.         OBJECTIVE:    Observation: Tight in L suboccipitals    Test measurements:      RESTRICTIONS/PRECAUTIONS: none    INTERVENTIONS:      Therapeutic Ex: 13 min Sets/sec Reps Notes   UBE 1 4    T- band Row/pinch 1 15 Shaky, black   T- band lower pinch 1 15 Shaky, black   Cervical rotation ball at wall 1 15 white   Upper cervical rotation stretch 30 4    Lateral taps 0  green   No money 0  green   Isometric at wall      Quadruped w cerv retract + alt UE lifts 1 10 2 lb   Front plank      Side plank      Prone swiss ball row 0  3 lb, bilat- cues form   Prone swiss ball sh ext 0  3 lb, bilat, cues form   Prone swiss ball T  0  2 lb bilat, bilat, cues form    Prone swiss ball Y 0  1 lb, bilat, cues form    Touchdown at wall with kneeling on BOSU ball 0  red band   Manual Intervention 31 min      Cerv mobs/manip 1 6 Mid and lower cervical supine, prone c2 L   Thoracic mobs/manip 1 3    CT manip 1 4    Rib mobilizations  4 L 1st rib   STM 1 10 bilateral   Seated upper cervical mobilization 1 4 bilateral   DN  0    NMR re-education: 0 min      T-spine Ext- foam roll 10 10    Chin tucks  10 10    No money      Wall Postural re-ed with head rotation and ball 2 10 White ball                         Therapeutic Exercise and NMR EXR  [x] (40343) Provided verbal/tactile cueing for activities related to strengthening, flexibility, endurance, ROM  for improvements in cervical, postural, scapular, scapulothoracic and UE control with self care, reaching, carrying, lifting, house/yardwork, driving/computer work. [x] (39414) Provided verbal/tactile cueing for activities related to improving balance, coordination, kinesthetic sense, posture, motor skill, proprioception  to assist with cervical, scapular, scapulothoracic and UE control with self care, reaching, carrying, lifting, house/yardwork, driving/computer work. Therapeutic Activities:    [] (87384 or 03210) Provided verbal/tactile cueing for activities related to improving balance, coordination, kinesthetic sense, posture, motor skill, proprioception and motor activation to allow for proper function of cervical, scapular, scapulothoracic and UE control with self care, carrying, lifting, driving/computer work.      Home Exercise Program:    [x] (57487) Reviewed/Progressed HEP activities related to strengthening, flexibility, endurance, ROM of cervical, scapular, scapulothoracic and UE control with self care, reaching, carrying, lifting, house/yardwork, driving/computer work  [] (21481) Reviewed/Progressed HEP activities related to improving balance, coordination, kinesthetic sense, posture, motor skill, proprioception of cervical, scapular, scapulothoracic and UE control with self Progressing: [] Met: [] Not Met: [] Adjusted  2. Patient will demonstrate increased AROM to [facility] of cervical/thoracic spine to allow for proper joint functioning as indicated by patients Functional Deficits. [] Progressing: [] Met: [] Not Met: [] Adjusted  3. Patient will demonstrate an increase in postural awareness and control and activation of  Deep cervical stabilizers to allow for proper functional mobility as indicated by patients Functional Deficits. [] Progressing: [] Met: [] Not Met: [] Adjusted  4. Patient will return to running, jumping, cutting functional activities without increased symptoms or restriction. [] Progressing: [] Met: [] Not Met: [] Adjusted  5. Patient to have normalized concussion symptoms to facilitate participation in movement, function, ADLs. [] Progressing: [] Met: [] Not Met: [] Adjusted   6. Patient will report being able to return to all sport activities without increased symptoms or restriction. (patient specific functional goal)    [] Progressing: [] Met: [] Not Met: [] Adjusted     ASSESSMENT:  Patient with lessening restriction in R suboccipitals and L lower cervical spine mobility and 1st rib. Patient tight in L upper cervical spine. Good improvement in all mobility at conclusion of session. Reduction of HA to 1/10 at conclusion. Treatment/Activity Tolerance:  [x] Patient tolerated treatment well [] Patient limited by fatique  [] Patient limited by pain  [] Patient limited by other medical complications  [] Other:     Overall Progression Towards Functional goals/ Treatment Progress Update:  [x] Patient is progressing as expected towards functional goals listed. [] Progression is slowed due to complexities/Impairments listed. [] Progression has been slowed due to co-morbidities.   [] Plan just implemented, too soon to assess goals progression <30days   [] Goals require adjustment due to lack of progress  [] Patient is not progressing as expected and requires additional follow up with physician  [] Other    Prognosis for POC: [x] Good [] Fair  [] Poor    Patient requires continued skilled intervention: [x] Yes  [] No        PLAN:   [x] Continue per plan of care [] Alter current plan (see comments)  [] Plan of care initiated [] Hold pending MD visit [] Discharge    Electronically signed by: Hubert Penaloza PT    Note: If patient does not return for scheduled/recommended follow up visits, this note will serve as a discharge from care along with the most recent update on progress.

## 2021-10-19 ENCOUNTER — HOSPITAL ENCOUNTER (OUTPATIENT)
Dept: PHYSICAL THERAPY | Age: 21
Setting detail: THERAPIES SERIES
Discharge: HOME | End: 2021-10-19
Payer: COMMERCIAL

## 2021-10-19 PROCEDURE — 97140 MANUAL THERAPY 1/> REGIONS: CPT

## 2021-10-19 PROCEDURE — 97110 THERAPEUTIC EXERCISES: CPT

## 2021-10-19 NOTE — PLAN OF CARE
[place] [address] [address]  Phone: [phone] Fax: [phone]        Physical Therapy Re-Certification Plan of Care/MD UPDATE      Dear Referring Practitioner: Dr Alea Verduzco,    We had the pleasure of treating the following patient for physical therapy services at [address] [place] [address]. A summary of our findings can be found in the updated assessment below. This includes our plan of care. If you have any questions or concerns regarding these findings, please do not hesitate to contact me at the office phone number checked above.   Thank you for the referral.     Physician Signature:________________________________Date:__________________  By signing above (or electronic signature), therapists plan is approved by physician    Date Range Of Visits: 2021 thru 10/19/2021  Total Visits to Date: 10  Overall Response to Treatment:   [x]Patient is responding well to treatment and improvement is noted with regards  to goals   []Patient should continue to improve in reasonable time if they continue HEP   []Patient has plateaued and is no longer responding to skilled PT intervention    []Patient is getting worse and would benefit from return to referring MD   []Patient unable to adhere to initial POC   []Other:       Physical Therapy Treatment Note/ Progress Report:     Date:  10/19/2021    Patient Name:  Grant Brooke    :  2000  MRN: [id number]  Restrictions/Precautions:    Medical/Treatment Diagnosis Information:  · Diagnosis: concussion, post concussion HA  · Treatment Diagnosis: concussion S06.0X0A, neck pain I95.3  Insurance/Certification information:  PT Insurance Information: Aetna/AG/[place]  Physician Information:  Referring Practitioner: Dr Omer Helton of care signed (Y/N):     Date of Patient follow up with Physician:      Progress Report: [x]  Yes  []  No     Date Range for reporting period:  Beginning: 9/2/2021  Ending: 10/19/2021    Progress report due (10 Rx/or 30 days whichever is less): 33/62/6585    Recertification due (POC duration/ or 90 days whichever is less):11/19/2021     Visit # Insurance Allowable Auth Needed   10 Aetna/[place] []Yes    [x]No     Pain level:  0-1/10     SUBJECTIVE:  Patient reports that she felt good after last PT. Is feeling about 85% improved since onset of therapy. Still with tightness in L side of neck, that when it gets very tight she will feel a little bit of HA, but nothing bad like it was.          OBJECTIVE:    Observation: Tight along L mid cervical spine    Test measurements:      RESTRICTIONS/PRECAUTIONS: none    INTERVENTIONS:      Therapeutic Ex: 16 min Sets/sec Reps Notes   UBE 1 4    T- band Row/pinch 1 15 Shaky, black   T- band lower pinch 1 15 Shaky, black   Cervical rotation ball at wall 1 15 white   Upper cervical rotation stretch 30 4    Lateral taps 0  green   No money 0  green   T/spine ext over roll 10 10    Quadruped w cerv retract + alt UE lifts 1 10 2 lb   Chin tuck 10 10    Side plank      Prone swiss ball row 0  3 lb, bilat- cues form   Prone swiss ball sh ext 0  3 lb, bilat, cues form   Prone swiss ball T  0  2 lb bilat, bilat, cues form    Prone swiss ball Y 0  1 lb, bilat, cues form    Touchdown at wall with kneeling on BOSU ball 0  red band   Manual Intervention 29 min      Cerv mobs/manip 1 10 Mid and lower cervical supine   Thoracic mobs/manip 1 3    CT manip 1 4    Rib mobilizations  4 L 1st rib   STM 1 8 bilateral   Seated upper cervical mobilization 1 0 bilateral   DN  0    NMR re-education: 0 min      T-spine Ext- foam roll 10 10    Chin tucks  10 10    No money      Wall Postural re-ed with head rotation and ball 2 10 White ball                         Therapeutic Exercise and NMR EXR  [x] (40926) Provided verbal/tactile cueing for activities related to strengthening, flexibility, endurance, ROM  for improvements in cervical, postural, scapular, scapulothoracic and UE control with self care, reaching, carrying, lifting, house/yardwork, driving/computer work. [x] (54497) Provided verbal/tactile cueing for activities related to improving balance, coordination, kinesthetic sense, posture, motor skill, proprioception  to assist with cervical, scapular, scapulothoracic and UE control with self care, reaching, carrying, lifting, house/yardwork, driving/computer work. Therapeutic Activities:    [] (52219 or 47263) Provided verbal/tactile cueing for activities related to improving balance, coordination, kinesthetic sense, posture, motor skill, proprioception and motor activation to allow for proper function of cervical, scapular, scapulothoracic and UE control with self care, carrying, lifting, driving/computer work.      Home Exercise Program:    [x] (92526) Reviewed/Progressed HEP activities related to strengthening, flexibility, endurance, ROM of cervical, scapular, scapulothoracic and UE control with self care, reaching, carrying, lifting, house/yardwork, driving/computer work  [] (95568) Reviewed/Progressed HEP activities related to improving balance, coordination, kinesthetic sense, posture, motor skill, proprioception of cervical, scapular, scapulothoracic and UE control with self care, reaching, carrying, lifting, house/yardwork, driving/computer work      Manual Treatments:  PROM / STM / Oscillations-Mobs:  G-I, II, III, IV (PA's, Inf., Post.)  [x] (78698) Provided manual therapy to mobilize soft tissue/joints of cervical/CT, scapular GHJ and UE for the purpose of decreasing headache, modulating pain, promoting relaxation,  increasing ROM, reducing/eliminating soft tissue swelling/inflammation/restriction, improving soft tissue extensibility and allowing for proper ROM for normal function with self care, reaching, carrying, lifting, house/yardwork, driving/computer work    Modalities:      Charges:  Timed Code Treatment Minutes: 45   Total Treatment Minutes: 45     [] EVAL (LOW) 05558 (typically 20 minutes face-to-face)  [] EVAL (MOD) 88553 (typically 30 minutes face-to-face)  [] EVAL (HIGH) 04234 (typically 45 minutes face-to-face)  [] RE-EVAL     [x] LG(12779) x 1    [] Dry needle 1 or 2 Muscles (99411)  [] NMR (65245) x     [] Dry needle 3+ Muscles (75065)  [x] Manual (70533) x 2      [] TA (37157) x     [] Mech Traction (25201)  [] ES(attended) (54472)     [] ES (un) (54306):   [] VASO (63452)  [] Other:      If BW Please Indicate Time In/Out  CPT Code Time in Time out                                     GOALS:  Patient stated goal: return to playing field hockey  [] Progressing: [x] Met: [] Not Met: [] Adjusted    Therapist goals for Patient:   Short Term Goals: To be achieved in: 2 weeks  1. Independent in HEP and progression per patient tolerance, in order to prevent re-injury. [] Progressing: [x] Met: [] Not Met: [] Adjusted  2. Patient will have a decrease in pain and concussion symptoms to facilitate improvement in movement, function, and ADLs as indicated by Functional Deficits. [x] Progressing: [] Met: [] Not Met: [] Adjusted    Long Term Goals: To be achieved in: 6 weeks  1. Disability index score of 10% or less for the NDI to assist with reaching prior level of function. [] Progressing: [x] Met: [] Not Met: [] Adjusted  2. Patient will demonstrate increased AROM to [facility] of cervical/thoracic spine to allow for proper joint functioning as indicated by patients Functional Deficits. [] Progressing: [x] Met: [] Not Met: [] Adjusted  3. Patient will demonstrate an increase in postural awareness and control and activation of  Deep cervical stabilizers to allow for proper functional mobility as indicated by patients Functional Deficits. [x] Progressing: [] Met: [] Not Met: [] Adjusted  4. Patient will return to running, jumping, cutting functional activities without increased symptoms or restriction.    [] Progressing: [x] Met: [] Not Met: [] Adjusted  5. Patient to have normalized concussion symptoms to facilitate participation in movement, function, ADLs. [] Progressing: [x] Met: [] Not Met: [] Adjusted   6. Patient will report being able to return to all sport activities without increased symptoms or restriction. (patient specific functional goal)    [] Progressing: [x] Met: [] Not Met: [] Adjusted     ASSESSMENT:  Patient has been seen for 10 visits of physical therapy to address concussion and neck pain. Patient had a mild set back with progression due to taking a hit 2 weeks ago in which she landed on shoulder and neck was strained further. Patient is having less overall soft tissue restriction in suboccipitals and improved mobility in upper cervical spine. Patient does continue with restriction in L mid cervical spine and is restricted in overall joint mobility which improves with mobilization and manipulation to area. Patient with improving postural awareness and overall strength of periscapular musculature. Patient will benefit from further skilled PT services. Will decrease frequency to 1x week at this time and work toward further tapering of PT services as indicated by patient symptoms. Current NDI is 8% and DHI is 6%. Treatment/Activity Tolerance:  [x] Patient tolerated treatment well [] Patient limited by fatique  [] Patient limited by pain  [] Patient limited by other medical complications  [] Other:     Overall Progression Towards Functional goals/ Treatment Progress Update:  [x] Patient is progressing as expected towards functional goals listed. [] Progression is slowed due to complexities/Impairments listed. [] Progression has been slowed due to co-morbidities.   [] Plan just implemented, too soon to assess goals progression <30days   [] Goals require adjustment due to lack of progress  [] Patient is not progressing as expected and requires additional follow up with physician  [] Other    Prognosis for POC: [x] Good [] Fair  [] Poor    Patient requires continued skilled intervention: [x] Yes  [] No        PLAN:   [x] Continue per plan of care [] Alter current plan (see comments)  [] Plan of care initiated [] Hold pending MD visit [] Discharge    Electronically signed by: Ana Alejandro PT    Note: If patient does not return for scheduled/recommended follow up visits, this note will serve as a discharge from care along with the most recent update on progress.

## 2021-10-27 ENCOUNTER — HOSPITAL ENCOUNTER (OUTPATIENT)
Dept: PHYSICAL THERAPY | Age: 21
Setting detail: THERAPIES SERIES
Discharge: HOME | End: 2021-10-27
Payer: COMMERCIAL

## 2021-10-27 PROCEDURE — 97140 MANUAL THERAPY 1/> REGIONS: CPT

## 2021-10-27 PROCEDURE — 97110 THERAPEUTIC EXERCISES: CPT

## 2021-10-27 NOTE — FLOWSHEET NOTE
[facility] [address] [address]  Phone: [phone] Fax: [phone]        Physical Therapy Treatment Note/ Progress Report:     Date:  10/27/2021    Patient Name:  Rayshawn Curran    :  2000  MRN: [id number]  Restrictions/Precautions:    Medical/Treatment Diagnosis Information:  · Diagnosis: concussion, post concussion HA  · Treatment Diagnosis: concussion S06.0X0A, neck pain Z84.1  Insurance/Certification information:  PT Insurance Information: Aetna/[place]  Physician Information:  Referring Practitioner: Dr Rayray Carpenter of care signed (Y/N):     Date of Patient follow up with Physician:      Progress Report: [x]  Yes  []  No     Date Range for reporting period:  Beginnin2021  Ending: 10/19/2021    Progress report due (10 Rx/or 30 days whichever is less):     Recertification due (POC duration/ or 90 days whichever is less):2021     Visit # Insurance Allowable Auth Needed   11 Aetna/AG/[place] []Yes    [x]No     Pain level:  0-1/10     SUBJECTIVE:  Patient reports that she felt good after last PT. Hasn't had a HA in quite a while. States that neck just feels stiff on L side. OBJECTIVE:    Observation: Tight along L mid cervical spine .  Restricted in L 1st rib and tight in scalene   Test measurements:      RESTRICTIONS/PRECAUTIONS: none    INTERVENTIONS:      Therapeutic Ex: 14 min Sets/sec Reps Notes   UBE 1 4    T- band Row/pinch 1 15 Shaky, black   T- band lower pinch 1 15 Shaky, black   Cervical rotation ball at wall 1 15 white   Upper cervical rotation stretch 30 4    Lateral taps 0  green   No money 0  green   T/spine ext over roll 10 10    Quadruped w cerv retract + alt UE lifts 1 10 2 lb   Chin tuck 10 10    Seated scalene stretch 30 4    Prone swiss ball row 0  3 lb, bilat- cues form   Prone swiss ball sh ext 0  3 lb, bilat, cues form   Prone swiss ball T  0  2 lb bilat, bilat, cues form cervical, scapular, scapulothoracic and UE control with self care, reaching, carrying, lifting, house/yardwork, driving/computer work      Manual Treatments:  PROM / STM / Oscillations-Mobs:  G-I, II, III, IV (Radha, Inf., Post.)  [x] (69260) Provided manual therapy to mobilize soft tissue/joints of cervical/CT, scapular GHJ and UE for the purpose of decreasing headache, modulating pain, promoting relaxation,  increasing ROM, reducing/eliminating soft tissue swelling/inflammation/restriction, improving soft tissue extensibility and allowing for proper ROM for normal function with self care, reaching, carrying, lifting, house/yardwork, driving/computer work    Modalities:      Charges:  Timed Code Treatment Minutes: 43   Total Treatment Minutes: 44     [] EVAL (LOW) 00658 (typically 20 minutes face-to-face)  [] EVAL (MOD) 31281 (typically 30 minutes face-to-face)  [] EVAL (HIGH) 94092 (typically 45 minutes face-to-face)  [] RE-EVAL     [x] SH(12718) x 1    [] Dry needle 1 or 2 Muscles (81767)  [] NMR (92128) x     [] Dry needle 3+ Muscles (19949)  [x] Manual (14825) x 2      [] TA (42392) x     [] Mech Traction (14851)  [] ES(attended) (79798)     [] ES (un) (61489):   [] VASO (39800)  [] Other:      If [employer] Please Indicate Time In/Out  CPT Code Time in Time out                                     GOALS:  Patient stated goal: return to playing field hockey  [] Progressing: [x] Met: [] Not Met: [] Adjusted    Therapist goals for Patient:   Short Term Goals: To be achieved in: 2 weeks  1. Independent in HEP and progression per patient tolerance, in order to prevent re-injury. [] Progressing: [x] Met: [] Not Met: [] Adjusted  2. Patient will have a decrease in pain and concussion symptoms to facilitate improvement in movement, function, and ADLs as indicated by Functional Deficits. [x] Progressing: [] Met: [] Not Met: [] Adjusted    Long Term Goals: To be achieved in: 6 weeks  1.  Disability index score of 10% or less for the NDI to assist with reaching prior level of function. [] Progressing: [x] Met: [] Not Met: [] Adjusted  2. Patient will demonstrate increased AROM to [facility] of cervical/thoracic spine to allow for proper joint functioning as indicated by patients Functional Deficits. [] Progressing: [x] Met: [] Not Met: [] Adjusted  3. Patient will demonstrate an increase in postural awareness and control and activation of  Deep cervical stabilizers to allow for proper functional mobility as indicated by patients Functional Deficits. [x] Progressing: [] Met: [] Not Met: [] Adjusted  4. Patient will return to running, jumping, cutting functional activities without increased symptoms or restriction. [] Progressing: [x] Met: [] Not Met: [] Adjusted  5. Patient to have normalized concussion symptoms to facilitate participation in movement, function, ADLs. [] Progressing: [x] Met: [] Not Met: [] Adjusted   6. Patient will report being able to return to all sport activities without increased symptoms or restriction. (patient specific functional goal)    [] Progressing: [x] Met: [] Not Met: [] Adjusted     ASSESSMENT:  Patient continues with restriction in L side of neck. Tight in L 1st rib and scalene. With reduction in soft tissue restriction, patient with improved cervical mobility. Patient with less overall restriction at conclusion. Educated patient for home scalene stretch to add to program.  Will decrease frequency to 1x week at this time and work toward further tapering of PT services as indicated by patient symptoms. Treatment/Activity Tolerance:  [x] Patient tolerated treatment well [] Patient limited by fatique  [] Patient limited by pain  [] Patient limited by other medical complications  [] Other:     Overall Progression Towards Functional goals/ Treatment Progress Update:  [x] Patient is progressing as expected towards functional goals listed.     [] Progression is slowed due to complexities/Impairments listed. [] Progression has been slowed due to co-morbidities. [] Plan just implemented, too soon to assess goals progression <30days   [] Goals require adjustment due to lack of progress  [] Patient is not progressing as expected and requires additional follow up with physician  [] Other    Prognosis for POC: [x] Good [] Fair  [] Poor    Patient requires continued skilled intervention: [x] Yes  [] No        PLAN:   [x] Continue per plan of care [] Alter current plan (see comments)  [] Plan of care initiated [] Hold pending MD visit [] Discharge    Electronically signed by: Ana Price PT    Note: If patient does not return for scheduled/recommended follow up visits, this note will serve as a discharge from care along with the most recent update on progress.

## 2021-11-03 ENCOUNTER — HOSPITAL ENCOUNTER (OUTPATIENT)
Dept: PHYSICAL THERAPY | Age: 21
Setting detail: THERAPIES SERIES
Discharge: HOME | End: 2021-11-03
Payer: COMMERCIAL

## 2021-11-03 PROCEDURE — 97140 MANUAL THERAPY 1/> REGIONS: CPT

## 2021-11-03 PROCEDURE — 97110 THERAPEUTIC EXERCISES: CPT

## 2021-11-03 NOTE — FLOWSHEET NOTE
[place] [address] [facility] [address] [address]  Phone: [phone] Fax: [phone]        Physical Therapy Treatment Note/ Progress Report:     Date:  11/3/2021    Patient Name:  Larry Doty    :  2000  MRN: [id number]  Restrictions/Precautions:    Medical/Treatment Diagnosis Information:  · Diagnosis: concussion, post concussion HA  · Treatment Diagnosis: concussion S06.0X0A, neck pain U72.2  Insurance/Certification information:  PT Insurance Information: Aetna/AG/[place]  Physician Information:  Referring Practitioner: Dr Jamari Cagle of care signed (Y/N):     Date of Patient follow up with Physician:      Progress Report: [x]  Yes  []  No     Date Range for reporting period:  Beginnin2021  Ending: 10/19/2021    Progress report due (10 Rx/or 30 days whichever is less):     Recertification due (POC duration/ or 90 days whichever is less):2021     Visit # Insurance Allowable Auth Needed   12 Aetna/AG/[place] []Yes    [x]No     Pain level:  0/10     SUBJECTIVE:  Patient reports that she felt good after last PT. Notes that she still hasn't had any HA. Neck is just feeling a bit stiff on L side of neck. Next game is this Friday. OBJECTIVE:    Observation: Tight along L mid cervical spine .  Restricted in L 1st rib and tight in scalene   Test measurements:      RESTRICTIONS/PRECAUTIONS: none    INTERVENTIONS:      Therapeutic Ex: 25 min Sets/sec Reps Notes   UBE 1 4    T- band Row/pinch 1 15 Shaky, black   T- band lower pinch 1 15 Shaky, black   Cervical rotation ball at wall 1 15 white   Upper cervical rotation stretch 30 4    Lateral taps 2 10 green   No money 2 10 green   T/spine ext over roll 10 10    Quadruped w cerv retract + alt UE lifts 1 10 2 lb   Chin tuck 10 10    Seated scalene stretch 30 4    Prone swiss ball row 1 15 3 lb, bilat- cues form   Prone swiss ball sh ext 1 15 3 lb, bilat, cues form   Prone swiss ball T  1 15 2 lb bilat, bilat, cues form    Prone swiss ball Y 1 15 1 lb, bilat, cues form    Touchdown at wall with kneeling on BOSU ball 0  red band   Manual Intervention 16 min      Cerv mobs/manip 1 10 Mid and lower cervical supine   Thoracic mobs/manip 1 3    CT manip 1 3    Rib mobilizations  0 L 1st rib   STM 1 0 L   Seated upper cervical mobilization 1 0 bilateral   Manual scalene stretch 30 0    NMR re-education: 0 min      T-spine Ext- foam roll 10 10    Chin tucks  10 10    No money      Wall Postural re-ed with head rotation and ball 2 10 White ball                         Therapeutic Exercise and NMR EXR  [x] (25633) Provided verbal/tactile cueing for activities related to strengthening, flexibility, endurance, ROM  for improvements in cervical, postural, scapular, scapulothoracic and UE control with self care, reaching, carrying, lifting, house/yardwork, driving/computer work. [x] (82277) Provided verbal/tactile cueing for activities related to improving balance, coordination, kinesthetic sense, posture, motor skill, proprioception  to assist with cervical, scapular, scapulothoracic and UE control with self care, reaching, carrying, lifting, house/yardwork, driving/computer work. Therapeutic Activities:    [] (07723 or 28422) Provided verbal/tactile cueing for activities related to improving balance, coordination, kinesthetic sense, posture, motor skill, proprioception and motor activation to allow for proper function of cervical, scapular, scapulothoracic and UE control with self care, carrying, lifting, driving/computer work.      Home Exercise Program:    [x] (45545) Reviewed/Progressed HEP activities related to strengthening, flexibility, endurance, ROM of cervical, scapular, scapulothoracic and UE control with self care, reaching, carrying, lifting, house/yardwork, driving/computer work  [] (92529) Reviewed/Progressed HEP activities related to improving balance, coordination, kinesthetic sense, posture, motor skill, proprioception of cervical, scapular, scapulothoracic and UE control with self care, reaching, carrying, lifting, house/yardwork, driving/computer work      Manual Treatments:  PROM / STM / Oscillations-Mobs:  G-I, II, III, IV (PA's, Inf., Post.)  [x] (19622) Provided manual therapy to mobilize soft tissue/joints of cervical/CT, scapular GHJ and UE for the purpose of decreasing headache, modulating pain, promoting relaxation,  increasing ROM, reducing/eliminating soft tissue swelling/inflammation/restriction, improving soft tissue extensibility and allowing for proper ROM for normal function with self care, reaching, carrying, lifting, house/yardwork, driving/computer work    Modalities:      Charges:  Timed Code Treatment Minutes: 41   Total Treatment Minutes: 42     [] EVAL (LOW) 93936 (typically 20 minutes face-to-face)  [] EVAL (MOD) 14991 (typically 30 minutes face-to-face)  [] EVAL (HIGH) 87503 (typically 45 minutes face-to-face)  [] RE-EVAL     [x] HS(37090) x 2    [] Dry needle 1 or 2 Muscles (04639)  [] NMR (13842) x     [] Dry needle 3+ Muscles (98959)  [x] Manual (65727) x 1      [] TA (42969) x     [] Mech Traction (67414)  [] ES(attended) (07592)     [] ES (un) (77241):   [] VASO (89120)  [] Other:      If [facility] Please Indicate Time In/Out  CPT Code Time in Time out                                     GOALS:  Patient stated goal: return to playing field hockey  [] Progressing: [x] Met: [] Not Met: [] Adjusted    Therapist goals for Patient:   Short Term Goals: To be achieved in: 2 weeks  1. Independent in HEP and progression per patient tolerance, in order to prevent re-injury. [] Progressing: [x] Met: [] Not Met: [] Adjusted  2. Patient will have a decrease in pain and concussion symptoms to facilitate improvement in movement, function, and ADLs as indicated by Functional Deficits. [x] Progressing: [] Met: [] Not Met: [] Adjusted    Long Term Goals:  To be achieved in: 6 weeks  1. Disability index score of 10% or less for the NDI to assist with reaching prior level of function. [] Progressing: [x] Met: [] Not Met: [] Adjusted  2. Patient will demonstrate increased AROM to [facility] of cervical/thoracic spine to allow for proper joint functioning as indicated by patients Functional Deficits. [] Progressing: [x] Met: [] Not Met: [] Adjusted  3. Patient will demonstrate an increase in postural awareness and control and activation of  Deep cervical stabilizers to allow for proper functional mobility as indicated by patients Functional Deficits. [x] Progressing: [] Met: [] Not Met: [] Adjusted  4. Patient will return to running, jumping, cutting functional activities without increased symptoms or restriction. [] Progressing: [x] Met: [] Not Met: [] Adjusted  5. Patient to have normalized concussion symptoms to facilitate participation in movement, function, ADLs. [] Progressing: [x] Met: [] Not Met: [] Adjusted   6. Patient will report being able to return to all sport activities without increased symptoms or restriction. (patient specific functional goal)    [] Progressing: [x] Met: [] Not Met: [] Adjusted     ASSESSMENT:  Patient continues with restriction in L side of neck. Tight in L 1st rib and scalene. With reduction in soft tissue restriction and joint manipulation, patient with improved cervical mobility. Patient with less overall restriction at conclusion. Worked on further improving posture and periscapular strengthening today. Will continue 1x week and work toward further tapering of PT services as indicated by patient symptoms.       Treatment/Activity Tolerance:  [x] Patient tolerated treatment well [] Patient limited by fatique  [] Patient limited by pain  [] Patient limited by other medical complications  [] Other:     Overall Progression Towards Functional goals/ Treatment Progress Update:  [x] Patient is progressing as expected towards functional goals listed. [] Progression is slowed due to complexities/Impairments listed. [] Progression has been slowed due to co-morbidities. [] Plan just implemented, too soon to assess goals progression <30days   [] Goals require adjustment due to lack of progress  [] Patient is not progressing as expected and requires additional follow up with physician  [] Other    Prognosis for POC: [x] Good [] Fair  [] Poor    Patient requires continued skilled intervention: [x] Yes  [] No        PLAN:   [x] Continue per plan of care [] Alter current plan (see comments)  [] Plan of care initiated [] Hold pending MD visit [] Discharge    Electronically signed by: Kostas Hood PT    Note: If patient does not return for scheduled/recommended follow up visits, this note will serve as a discharge from care along with the most recent update on progress.

## 2021-11-08 ENCOUNTER — HOSPITAL ENCOUNTER (OUTPATIENT)
Dept: PHYSICAL THERAPY | Age: 21
Setting detail: THERAPIES SERIES
Discharge: HOME | End: 2021-11-08
Payer: COMMERCIAL

## 2021-11-08 PROCEDURE — 97110 THERAPEUTIC EXERCISES: CPT

## 2021-11-08 PROCEDURE — 97140 MANUAL THERAPY 1/> REGIONS: CPT

## 2021-11-08 NOTE — FLOWSHEET NOTE
[address] [place], [place] [address]  Phone: [phone] Fax: [phone]        Physical Therapy Treatment Note/ Progress Report:     Date:  2021    Patient Name:  Dinorah Matos    :  2000  MRN: [id number]  Restrictions/Precautions:    Medical/Treatment Diagnosis Information:  · Diagnosis: concussion, post concussion HA  · Treatment Diagnosis: concussion S06.0X0A, neck pain C16.1  Insurance/Certification information:  PT Insurance Information: Aetna/[place]  Physician Information:  Referring Practitioner: Dr Cobb Carry of care signed (Y/N):     Date of Patient follow up with Physician:      Progress Report: [x]  Yes  []  No     Date Range for reporting period:  Beginnin2021  Ending: 10/19/2021    Progress report due (10 Rx/or 30 days whichever is less):     Recertification due (POC duration/ or 90 days whichever is less):2021     Visit # Insurance Allowable Auth Needed   13 Aetna/AG/[place] []Yes    [x]No     Pain level:  0/10     SUBJECTIVE:  Patient reports that she felt good after last PT. Reports that neck isn't as stiff as it has been. Took a hit in her games over the weekend that she feels would normally give her more concussion issues; but she was fine with this. Feels like PT is working and helping. Has [employer] tournament next. Has to leave early for class.     OBJECTIVE:    Observation: Mild restriction in L C5/6 and C6/7   Test measurements:      RESTRICTIONS/PRECAUTIONS: none    INTERVENTIONS:      Therapeutic Ex: 20 min Sets/sec Reps Notes   UBE 1 4    T- band Row/pinch 1 15 Shaky, black   T- band lower pinch 1 15 Shaky, black   Cervical rotation ball at wall 1 15 white   Upper cervical rotation stretch 30 4    Lateral taps 2 10 green   No money 2 10 green   T/spine ext over roll 10 10    Quadruped w cerv retract + alt UE lifts 1 10 2 lb   Chin tuck 10 10    Seated scalene stretch 30 4 Prone swiss ball row 0  3 lb, bilat- cues form   Prone swiss ball sh ext 0  3 lb, bilat, cues form   Prone swiss ball T  1 15 2 lb bilat, bilat, cues form    Prone swiss ball Y 1 15 1 lb, bilat, cues form    Touchdown at wall with kneeling on BOSU ball 0  red band   Manual Intervention 16 min      Cerv mobs/manip 1 10 Mid and lower cervical supine   Thoracic mobs/manip 1 3    CT manip 1 3    Rib mobilizations  0 L 1st rib   STM 1 0 L   Seated upper cervical mobilization 1 0 bilateral   Manual scalene stretch 30 0    NMR re-education: 0 min      T-spine Ext- foam roll 10 10    Chin tucks  10 10    No money      Wall Postural re-ed with head rotation and ball 2 10 White ball                         Therapeutic Exercise and NMR EXR  [x] (87442) Provided verbal/tactile cueing for activities related to strengthening, flexibility, endurance, ROM  for improvements in cervical, postural, scapular, scapulothoracic and UE control with self care, reaching, carrying, lifting, house/yardwork, driving/computer work. [x] (45561) Provided verbal/tactile cueing for activities related to improving balance, coordination, kinesthetic sense, posture, motor skill, proprioception  to assist with cervical, scapular, scapulothoracic and UE control with self care, reaching, carrying, lifting, house/yardwork, driving/computer work. Therapeutic Activities:    [] (36980 or 99639) Provided verbal/tactile cueing for activities related to improving balance, coordination, kinesthetic sense, posture, motor skill, proprioception and motor activation to allow for proper function of cervical, scapular, scapulothoracic and UE control with self care, carrying, lifting, driving/computer work.      Home Exercise Program:    [x] (45971) Reviewed/Progressed HEP activities related to strengthening, flexibility, endurance, ROM of cervical, scapular, scapulothoracic and UE control with self care, reaching, carrying, lifting, house/yardwork, driving/computer work  [] (46355) Reviewed/Progressed HEP activities related to improving balance, coordination, kinesthetic sense, posture, motor skill, proprioception of cervical, scapular, scapulothoracic and UE control with self care, reaching, carrying, lifting, house/yardwork, driving/computer work      Manual Treatments:  PROM / STM / Oscillations-Mobs:  G-I, II, III, IV (PA's, Inf., Post.)  [x] (19925) Provided manual therapy to mobilize soft tissue/joints of cervical/CT, scapular GHJ and UE for the purpose of decreasing headache, modulating pain, promoting relaxation,  increasing ROM, reducing/eliminating soft tissue swelling/inflammation/restriction, improving soft tissue extensibility and allowing for proper ROM for normal function with self care, reaching, carrying, lifting, house/yardwork, driving/computer work    Modalities:      Charges:  Timed Code Treatment Minutes: 36   Total Treatment Minutes: 36     [] EVAL (LOW) 35925 (typically 20 minutes face-to-face)  [] EVAL (MOD) 48896 (typically 30 minutes face-to-face)  [] EVAL (HIGH) 61369 (typically 45 minutes face-to-face)  [] RE-EVAL     [x] MQ(73892) x 1    [] Dry needle 1 or 2 Muscles (93373)  [] NMR (74058) x     [] Dry needle 3+ Muscles (40942)  [x] Manual (29300) x 1      [] TA (59920) x     [] Mech Traction (63814)  [] ES(attended) (40741)     [] ES (un) (54436):   [] VASO (52545)  [] Other:      If [facility] Please Indicate Time In/Out  CPT Code Time in Time out                                     GOALS:  Patient stated goal: return to playing field hockey  [] Progressing: [x] Met: [] Not Met: [] Adjusted    Therapist goals for Patient:   Short Term Goals: To be achieved in: 2 weeks  1. Independent in HEP and progression per patient tolerance, in order to prevent re-injury. [] Progressing: [x] Met: [] Not Met: [] Adjusted  2.  Patient will have a decrease in pain and concussion symptoms to facilitate improvement in movement, function, and ADLs as indicated by Functional Deficits. [x] Progressing: [] Met: [] Not Met: [] Adjusted    Long Term Goals: To be achieved in: 6 weeks  1. Disability index score of 10% or less for the NDI to assist with reaching prior level of function. [] Progressing: [x] Met: [] Not Met: [] Adjusted  2. Patient will demonstrate increased AROM to [facility] of cervical/thoracic spine to allow for proper joint functioning as indicated by patients Functional Deficits. [] Progressing: [x] Met: [] Not Met: [] Adjusted  3. Patient will demonstrate an increase in postural awareness and control and activation of  Deep cervical stabilizers to allow for proper functional mobility as indicated by patients Functional Deficits. [x] Progressing: [] Met: [] Not Met: [] Adjusted  4. Patient will return to running, jumping, cutting functional activities without increased symptoms or restriction. [] Progressing: [x] Met: [] Not Met: [] Adjusted  5. Patient to have normalized concussion symptoms to facilitate participation in movement, function, ADLs. [] Progressing: [x] Met: [] Not Met: [] Adjusted   6. Patient will report being able to return to all sport activities without increased symptoms or restriction. (patient specific functional goal)    [] Progressing: [x] Met: [] Not Met: [] Adjusted     ASSESSMENT:  Patient continues less restriction in L side of neck, but still at c5/6 and c6/7. Patient tolerated manipulation well. Good overall improvement noted in posture and periscapular strengthening Will continue 1x week and work toward further tapering of PT services as indicated by patient symptoms. Session shortened today as patient had to leave early for class.      Treatment/Activity Tolerance:  [x] Patient tolerated treatment well [] Patient limited by fatique  [] Patient limited by pain  [] Patient limited by other medical complications  [] Other:     Overall Progression Towards Functional goals/ Treatment Progress Update:  [x] Patient is progressing as expected towards functional goals listed. [] Progression is slowed due to complexities/Impairments listed. [] Progression has been slowed due to co-morbidities. [] Plan just implemented, too soon to assess goals progression <30days   [] Goals require adjustment due to lack of progress  [] Patient is not progressing as expected and requires additional follow up with physician  [] Other    Prognosis for POC: [x] Good [] Fair  [] Poor    Patient requires continued skilled intervention: [x] Yes  [] No        PLAN:   [x] Continue per plan of care [] Alter current plan (see comments)  [] Plan of care initiated [] Hold pending MD visit [] Discharge    Electronically signed by: Angelica Tim PT    Note: If patient does not return for scheduled/recommended follow up visits, this note will serve as a discharge from care along with the most recent update on progress.

## 2021-11-15 ENCOUNTER — HOSPITAL ENCOUNTER (OUTPATIENT)
Dept: PHYSICAL THERAPY | Age: 21
Setting detail: THERAPIES SERIES
Discharge: HOME | End: 2021-11-15
Payer: COMMERCIAL

## 2021-11-15 NOTE — FLOWSHEET NOTE
[place]    Physical Therapy  Cancellation/No-show Note  Patient Name:  Harini Quiñonez  :  2000   Date:  11/15/2021  Cancelled visits to date: 0  No-shows to date: 1    For today's appointment patient:  []  Cancelled  []  Rescheduled appointment  [x]  No-show     Reason given by patient:  []  Patient ill  []  Conflicting appointment  []  No transportation    []  Conflict with work  []  No reason given  []  Other:     Comments:      Electronically signed by:  Radha Jaeger PT, PT

## 2021-12-02 ENCOUNTER — HOSPITAL ENCOUNTER (OUTPATIENT)
Dept: PHYSICAL THERAPY | Age: 21
Setting detail: THERAPIES SERIES
Discharge: HOME | End: 2021-12-02
Payer: COMMERCIAL

## 2021-12-02 PROCEDURE — 97140 MANUAL THERAPY 1/> REGIONS: CPT

## 2021-12-02 PROCEDURE — 20560 NDL INSJ W/O NJX 1 OR 2 MUSC: CPT

## 2021-12-02 PROCEDURE — 97032 APPL MODALITY 1+ESTIM EA 15: CPT
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
## 2021-12-03 ENCOUNTER — HOSPITAL ENCOUNTER (OUTPATIENT)
Dept: PHYSICAL THERAPY | Age: 21
Setting detail: THERAPIES SERIES
Discharge: HOME | End: 2021-12-03
Payer: COMMERCIAL

## 2021-12-03 PROCEDURE — 97140 MANUAL THERAPY 1/> REGIONS: CPT
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
## 2021-12-07 ENCOUNTER — HOSPITAL ENCOUNTER (OUTPATIENT)
Dept: PHYSICAL THERAPY | Age: 21
Setting detail: THERAPIES SERIES
Discharge: HOME | End: 2021-12-07
Payer: COMMERCIAL

## 2021-12-07 PROCEDURE — 97140 MANUAL THERAPY 1/> REGIONS: CPT

## 2021-12-07 PROCEDURE — 97110 THERAPEUTIC EXERCISES: CPT

## 2021-12-07 NOTE — FLOWSHEET NOTE
[address] [place] [address]  Phone: [phone] Fax: [phone]      Physical Therapy Treatment Note/ Progress Report:     Date:  2021    Patient Name:  Arlin Burkett    :  2000  MRN: [id number]  Restrictions/Precautions:    Medical/Treatment Diagnosis Information:  · Diagnosis: concussion, post concussion HA  · Treatment Diagnosis: concussion S06.0X0A, neck pain K35.0  Insurance/Certification information:  PT Insurance Information: Aetna/AG/[place]  Physician Information:  Referring Practitioner: Dr Ephraim Edouard of care signed (Y/N):     Date of Patient follow up with Physician:      Progress Report: [x]  Yes  []  No     Date Range for reporting period:  Beginning:10/19/2021  Endin2021    Progress report due (10 Rx/or 30 days whichever is less):     Recertification due (POC duration/ or 90 days whichever is less):2021     Visit # Insurance Allowable Auth Needed   16 Aetna/AG/[place] []Yes    [x]No     Pain level:  0-1/10 HA     SUBJECTIVE:  Patient reports that she is feeling much better. Notes that today is the first day that she really hasn't had much of a HA at all. Notes that she doesn't have any neck pain or head pressure either.       OBJECTIVE:    Observation: Mild restriction in L C5/6 and C6/7   Test measurements:  NDI 40% disability         Special Test Results Symptoms and Time until Resolution of Symptoms   Dizziness Handicap Inventory  18% disability     BERTHA Held assessment today                           VOMS Not Tested Headache 0-10 Dizziness 0-10 Nausea 0-10 Fogginess 0-10 Comments   Baseline Symptoms:   7.5/10 0 2 2     Smooth Pursuits   7.5/8 0/1 2/2 2/2 Horizontal/vertical (vertical with increased difficulty with eye tracking)   Saccades-Horizontal      8 1 2 2     Saccades-Vertical   8 1 2 2     Convergence (near [place] 1)       Not tested today         RESTRICTIONS/PRECAUTIONS: none    INTERVENTIONS:      Therapeutic Ex: 10 min Sets/sec Reps Notes   UBE 1 4    T- band Row/pinch 1 15 Shaky, black   T- band lower pinch 1 15 Shaky, black   Cervical rotation ball at wall 1 15 white   Upper cervical rotation stretch 30 4    Lateral taps 2 10 green   No money + cerv retraction at wall 2 10 green   T/spine ext over roll 10 10    Quadruped w cerv retract + alt UE lifts 1 10 2 lb   Chin tuck 10 10    Seated scalene stretch 30 4    Prone swiss ball row 0  3 lb, bilat- cues form   Prone swiss ball sh ext 0  3 lb, bilat, cues form   Prone swiss ball T  1 15 2 lb bilat, bilat, cues form    Prone swiss ball Y 1 15 1 lb, bilat, cues form    Touchdown at wall with kneeling on BOSU ball 0  red band   Manual Intervention 18 min      Cerv mobs/manip 1 12 cervical C2-T1   Thoracic mobs/manip 1 3    CT manip 1 3    Rib mobilizations  0 L 1st rib   STM 1 0 L   Seated upper cervical mobilization 1 0 bilateral   Manual scalene stretch 30 0    NMR re-education: 0 min      T-spine Ext- foam roll 10 10    Chin tucks  10 10    No money      Wall Postural re-ed with head rotation and ball 2 10 White ball                     Therapeutic Exercise and NMR EXR  [x] (89619) Provided verbal/tactile cueing for activities related to strengthening, flexibility, endurance, ROM  for improvements in cervical, postural, scapular, scapulothoracic and UE control with self care, reaching, carrying, lifting, house/yardwork, driving/computer work. [x] (90700) Provided verbal/tactile cueing for activities related to improving balance, coordination, kinesthetic sense, posture, motor skill, proprioception  to assist with cervical, scapular, scapulothoracic and UE control with self care, reaching, carrying, lifting, house/yardwork, driving/computer work.     Therapeutic Activities:    [] (04105 or 74704) Provided verbal/tactile cueing for activities related to improving balance, coordination, kinesthetic sense, posture, motor skill, proprioception and motor activation to allow for proper function of cervical, scapular, scapulothoracic and UE control with self care, carrying, lifting, driving/computer work.      Home Exercise Program:    [x] (42834) Reviewed/Progressed HEP activities related to strengthening, flexibility, endurance, ROM of cervical, scapular, scapulothoracic and UE control with self care, reaching, carrying, lifting, house/yardwork, driving/computer work  [] (23429) Reviewed/Progressed HEP activities related to improving balance, coordination, kinesthetic sense, posture, motor skill, proprioception of cervical, scapular, scapulothoracic and UE control with self care, reaching, carrying, lifting, house/yardwork, driving/computer work      Manual Treatments:  PROM / STM / Oscillations-Mobs:  G-I, II, III, IV (PA's, Inf., Post.)  [x] (65379) Provided manual therapy to mobilize soft tissue/joints of cervical/CT, scapular GHJ and UE for the purpose of decreasing headache, modulating pain, promoting relaxation,  increasing ROM, reducing/eliminating soft tissue swelling/inflammation/restriction, improving soft tissue extensibility and allowing for proper ROM for normal function with self care, reaching, carrying, lifting, house/yardwork, driving/computer work    Modalities:      Charges:  Timed Code Treatment Minutes: 28   Total Treatment Minutes: 30     [] EVAL (LOW) 63820 (typically 20 minutes face-to-face)  [] EVAL (MOD) 68288 (typically 30 minutes face-to-face)  [] EVAL (HIGH) 08771 (typically 45 minutes face-to-face)  [] RE-EVAL     [x] MD(00516) x 1    [] Dry needle 1 or 2 Muscles (29715)  [] NMR (60348) x     [] Dry needle 3+ Muscles (62831)  [x] Manual (75340) x 1      [] TA (31232) x     [] Mech Traction (83327)  [] ES(attended) (29766)     [] ES (un) (55713):   [] VASO (25269)  [] Other:      If BWC Please Indicate Time In/Out  CPT Code Time in Time out                                     GOALS:  Patient stated goal: return to playing field hockey  [] Progressing: [] Met: [] Not Met: [] Adjusted    Therapist goals for Patient:   Short Term Goals: To be achieved in: 2 weeks  1. Independent in HEP and progression per patient tolerance, in order to prevent re-injury. [] Progressing: [] Met: [] Not Met: [] Adjusted  2. Patient will have a decrease in pain and concussion symptoms to facilitate improvement in movement, function, and ADLs as indicated by Functional Deficits. [] Progressing: [] Met: [] Not Met: [] Adjusted    Long Term Goals: To be achieved in: 6 weeks  1. Disability index score of 10% or less for the NDI to assist with reaching prior level of function. [] Progressing: [] Met: [] Not Met: [] Adjusted  2. Patient will demonstrate increased AROM to [facility] of cervical/thoracic spine to allow for proper joint functioning as indicated by patients Functional Deficits. [] Progressing: [] Met: [] Not Met: [] Adjusted  3. Patient will demonstrate an increase in postural awareness and control and activation of  Deep cervical stabilizers to allow for proper functional mobility as indicated by patients Functional Deficits. [] Progressing: [] Met: [] Not Met: [] Adjusted  4. Patient will return to running, jumping, cutting functional activities without increased symptoms or restriction. [] Progressing: [] Met: [] Not Met: [] Adjusted  5. Patient to have normalized concussion symptoms to facilitate participation in movement, function, ADLs. [] Progressing: [] Met: [] Not Met: [] Adjusted   6. Patient will report being able to return to all sport activities without increased symptoms or restriction. (patient specific functional goal)    [] Progressing: [] Met: [] Not Met: [] Adjusted     ASSESSMENT:  Patient with continued improvement in restriction in cervical spine. Patient still a bit tight at C5/6, C6/7 and C7/T1. Good improvement in mobility with manual therapy to restricted areas. Less overall tenderness and HA at conclusion. Address further periscapular strength and posture. No HA at conclusion. Treatment/Activity Tolerance:  [x] Patient tolerated treatment well [] Patient limited by fatique  [] Patient limited by pain  [] Patient limited by other medical complications  [] Other:     Overall Progression Towards Functional goals/ Treatment Progress Update:  [x] Patient is progressing as expected towards functional goals listed. [] Progression is slowed due to complexities/Impairments listed. [] Progression has been slowed due to co-morbidities. [] Plan just implemented, too soon to assess goals progression <30days   [] Goals require adjustment due to lack of progress  [] Patient is not progressing as expected and requires additional follow up with physician  [] Other    Prognosis for POC: [x] Good [] Fair  [] Poor    Patient requires continued skilled intervention: [x] Yes  [] No        PLAN:   [x] Continue per plan of care [] Alter current plan (see comments)  [] Plan of care initiated [] Hold pending MD visit [] Discharge    Electronically signed by: Carlos Field PT    Note: If patient does not return for scheduled/recommended follow up visits, this note will serve as a discharge from care along with the most recent update on progress.

## 2021-12-08 ENCOUNTER — HOSPITAL ENCOUNTER (OUTPATIENT)
Dept: PHYSICAL THERAPY | Age: 21
Setting detail: THERAPIES SERIES
Discharge: HOME | End: 2021-12-08
Payer: COMMERCIAL

## 2021-12-08 PROCEDURE — 97140 MANUAL THERAPY 1/> REGIONS: CPT

## 2021-12-08 NOTE — FLOWSHEET NOTE
[place] [address] [facility]  Phone: [phone] Fax: [phone]      Physical Therapy Treatment Note/ Progress Report:     Date:  2021    Patient Name:  Rayshawn Curran    :  2000  MRN: [id number]  Restrictions/Precautions:    Medical/Treatment Diagnosis Information:  · Diagnosis: concussion, post concussion HA  · Treatment Diagnosis: concussion S06.0X0A, neck pain S88.5  Insurance/Certification information:  PT Insurance Information: Aetna/[place]  Physician Information:  Referring Practitioner: Dr Rayray Carpenter of care signed (Y/N):     Date of Patient follow up with Physician:      Progress Report: [x]  Yes  []  No     Date Range for reporting period:  Beginning:10/19/2021  Endin2021    Progress report due (10 Rx/or 30 days whichever is less):     Recertification due (POC duration/ or 90 days whichever is less):2021     Visit # Insurance Allowable Auth Needed   17 Aetna/[place] []Yes    [x]No     Pain level:  0-1/10 HA     SUBJECTIVE:  Patient reports that she is has just a little HA today. Felt good after yesterday, but feeling like her HA today may just be from stress of exams and getting ready to travel.        OBJECTIVE:    Observation: Mild restriction in L C5/6 and C6/7   Test measurements:  NDI 40% disability         Special Test Results Symptoms and Time until Resolution of Symptoms   Dizziness Handicap Inventory  18% disability     BERTHA Held assessment today                           VOMS Not Tested Headache 0-10 Dizziness 0-10 Nausea 0-10 Fogginess 0-10 Comments   Baseline Symptoms:   7.5/10 0 2 2     Smooth Pursuits   7.5/8 0/1 2/2 2/2 Horizontal/vertical (vertical with increased difficulty with eye tracking)   Saccades-Horizontal      8 1 2 2     Saccades-Vertical   8 1 2 2     Convergence (near [place] 1)       Not tested today         RESTRICTIONS/PRECAUTIONS: none    INTERVENTIONS:      Therapeutic Ex: 0 min Sets/sec Reps Notes   UBE 1 4    T- band Row/pinch 1 15 Shaky, black   T- band lower pinch 1 15 Shaky, black   Cervical rotation ball at wall 1 15 white   Upper cervical rotation stretch 30 4    Lateral taps 2 10 green   No money + cerv retraction at wall 2 10 green   T/spine ext over roll 10 10    Quadruped w cerv retract + alt UE lifts 1 10 2 lb   Chin tuck 10 10    Seated scalene stretch 30 4    Prone swiss ball row 0  3 lb, bilat- cues form   Prone swiss ball sh ext 0  3 lb, bilat, cues form   Prone swiss ball T  1 15 2 lb bilat, bilat, cues form    Prone swiss ball Y 1 15 1 lb, bilat, cues form    Touchdown at wall with kneeling on BOSU ball 0  red band   Manual Intervention 18 min      Cerv mobs/manip 1 12 cervical C2-T1   Thoracic mobs/manip 1 3    CT manip 1 3    Rib mobilizations  0 L 1st rib   STM 1 0 L   Seated upper cervical mobilization 1 0 bilateral   Manual scalene stretch 30 0    NMR re-education: 0 min      T-spine Ext- foam roll 10 10    Chin tucks  10 10    No money      Wall Postural re-ed with head rotation and ball 2 10 White ball                     Therapeutic Exercise and NMR EXR  [x] (83660) Provided verbal/tactile cueing for activities related to strengthening, flexibility, endurance, ROM  for improvements in cervical, postural, scapular, scapulothoracic and UE control with self care, reaching, carrying, lifting, house/yardwork, driving/computer work. [x] (49259) Provided verbal/tactile cueing for activities related to improving balance, coordination, kinesthetic sense, posture, motor skill, proprioception  to assist with cervical, scapular, scapulothoracic and UE control with self care, reaching, carrying, lifting, house/yardwork, driving/computer work.     Therapeutic Activities:    [] (87953 or 32781) Provided verbal/tactile cueing for activities related to improving balance, coordination, kinesthetic sense, posture, motor skill, proprioception and motor activation to allow for proper function of cervical, scapular, scapulothoracic and UE control with self care, carrying, lifting, driving/computer work.      Home Exercise Program:    [x] (87950) Reviewed/Progressed HEP activities related to strengthening, flexibility, endurance, ROM of cervical, scapular, scapulothoracic and UE control with self care, reaching, carrying, lifting, house/yardwork, driving/computer work  [] (80323) Reviewed/Progressed HEP activities related to improving balance, coordination, kinesthetic sense, posture, motor skill, proprioception of cervical, scapular, scapulothoracic and UE control with self care, reaching, carrying, lifting, house/yardwork, driving/computer work      Manual Treatments:  PROM / STM / Oscillations-Mobs:  G-I, II, III, IV (PA's, Inf., Post.)  [x] (64879) Provided manual therapy to mobilize soft tissue/joints of cervical/CT, scapular GHJ and UE for the purpose of decreasing headache, modulating pain, promoting relaxation,  increasing ROM, reducing/eliminating soft tissue swelling/inflammation/restriction, improving soft tissue extensibility and allowing for proper ROM for normal function with self care, reaching, carrying, lifting, house/yardwork, driving/computer work    Modalities:      Charges:  Timed Code Treatment Minutes: 18   Total Treatment Minutes: 20     [] EVAL (LOW) 92837 (typically 20 minutes face-to-face)  [] EVAL (MOD) 04661 (typically 30 minutes face-to-face)  [] EVAL (HIGH) 32035 (typically 45 minutes face-to-face)  [] RE-EVAL     [] UW(64188) x     [] Dry needle 1 or 2 Muscles (59357)  [] NMR (68181) x     [] Dry needle 3+ Muscles (29008)  [x] Manual (86318) x 1      [] TA (19222) x     [] Mech Traction (38269)  [] ES(attended) (61177)     [] ES (un) (18803):   [] VASO (30704)  [] Other:      If BWC Please Indicate Time In/Out  CPT Code Time in Time out                                     GOALS:  Patient stated goal: return to playing field hockey  [] Progressing: [] Met: [] Not Met: [] Adjusted    Therapist goals for Patient:   Short Term Goals: To be achieved in: 2 weeks  1. Independent in HEP and progression per patient tolerance, in order to prevent re-injury. [] Progressing: [] Met: [] Not Met: [] Adjusted  2. Patient will have a decrease in pain and concussion symptoms to facilitate improvement in movement, function, and ADLs as indicated by Functional Deficits. [] Progressing: [] Met: [] Not Met: [] Adjusted    Long Term Goals: To be achieved in: 6 weeks  1. Disability index score of 10% or less for the NDI to assist with reaching prior level of function. [] Progressing: [] Met: [] Not Met: [] Adjusted  2. Patient will demonstrate increased AROM to [facility] of cervical/thoracic spine to allow for proper joint functioning as indicated by patients Functional Deficits. [] Progressing: [] Met: [] Not Met: [] Adjusted  3. Patient will demonstrate an increase in postural awareness and control and activation of  Deep cervical stabilizers to allow for proper functional mobility as indicated by patients Functional Deficits. [] Progressing: [] Met: [] Not Met: [] Adjusted  4. Patient will return to running, jumping, cutting functional activities without increased symptoms or restriction. [] Progressing: [] Met: [] Not Met: [] Adjusted  5. Patient to have normalized concussion symptoms to facilitate participation in movement, function, ADLs. [] Progressing: [] Met: [] Not Met: [] Adjusted   6. Patient will report being able to return to all sport activities without increased symptoms or restriction. (patient specific functional goal)    [] Progressing: [] Met: [] Not Met: [] Adjusted     ASSESSMENT:  Patient with increased tenderness along cervical paraspinals today. Tolerated all manipulation to c/s and t/s. No HA at conclusion.  No focus on exercises today as patient heading to another exam.        Treatment/Activity Tolerance:  [x] Patient tolerated treatment well [] Patient limited by fatique  [] Patient limited by pain  [] Patient limited by other medical complications  [] Other:     Overall Progression Towards Functional goals/ Treatment Progress Update:  [x] Patient is progressing as expected towards functional goals listed. [] Progression is slowed due to complexities/Impairments listed. [] Progression has been slowed due to co-morbidities. [] Plan just implemented, too soon to assess goals progression <30days   [] Goals require adjustment due to lack of progress  [] Patient is not progressing as expected and requires additional follow up with physician  [] Other    Prognosis for POC: [x] Good [] Fair  [] Poor    Patient requires continued skilled intervention: [x] Yes  [] No        PLAN:   [x] Continue per plan of care [] Alter current plan (see comments)  [] Plan of care initiated [] Hold pending MD visit [] Discharge    Electronically signed by: Maggie Vera PT    Note: If patient does not return for scheduled/recommended follow up visits, this note will serve as a discharge from care along with the most recent update on progress.

## 2022-03-14 ENCOUNTER — HOSPITAL ENCOUNTER (OUTPATIENT)
Dept: PHYSICAL THERAPY | Age: 22
Setting detail: THERAPIES SERIES
Discharge: HOME | End: 2022-03-14
Payer: COMMERCIAL

## 2022-03-14 PROCEDURE — 97110 THERAPEUTIC EXERCISES: CPT

## 2022-03-14 PROCEDURE — 97164 PT RE-EVAL EST PLAN CARE: CPT

## 2022-03-14 PROCEDURE — 97112 NEUROMUSCULAR REEDUCATION: CPT

## 2022-03-14 PROCEDURE — 97140 MANUAL THERAPY 1/> REGIONS: CPT
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
## 2022-03-17 ENCOUNTER — HOSPITAL ENCOUNTER (OUTPATIENT)
Dept: PHYSICAL THERAPY | Age: 22
Setting detail: THERAPIES SERIES
Discharge: HOME | End: 2022-03-17
Payer: COMMERCIAL

## 2022-03-17 PROCEDURE — 97112 NEUROMUSCULAR REEDUCATION: CPT

## 2022-03-17 PROCEDURE — 97110 THERAPEUTIC EXERCISES: CPT

## 2022-03-17 PROCEDURE — 97140 MANUAL THERAPY 1/> REGIONS: CPT

## 2022-03-17 NOTE — FLOWSHEET NOTE
[address] [place], [place] [address]  Phone: [phone] Fax: [phone]        Physical Therapy Treatment Note/ Progress Report:     Date:  3/17/2022    Patient Name:  Charlene Guerra    :  2000  MRN: [id number]  Restrictions/Precautions:    Medical/Treatment Diagnosis Information:  · Diagnosis: concussion, post concussion HA  · Treatment Diagnosis: concussion S06.0X0A, neck pain Y62.8  Insurance/Certification information:  PT Insurance Information: Aetna/[place]  Physician Information:  Referring Practitioner: Dr Alaina Joyce of care signed (Y/N):     Date of Patient follow up with Physician:      Progress Report: [x]  Yes  []  No     Date Range for reporting period:  Beginning:3/14/2022  Ending: -    Progress report due (10 Rx/or 30 days whichever is less): 3/08/8049    Recertification due (POC duration/ or 90 days whichever is less): 2022    Visit # Insurance Allowable Auth Needed   2 (17 prior in ) Aetna Student []Yes    [x]No     Pain level:  5/10 HA     SUBJECTIVE:  Patient reports that she felt some improved since last session already. Also spoke with doc and will be seeing sports psychologist later today, but is already feeling better and not having as many issues with emotionally feeling overwhelmed since returning to therapy and speaking with doc.       OBJECTIVE:    Observation:    Test measurements:  NDI 26% disability, DHI 24% disability         CERV ROM       Cervical Flexion 80     Cervical Extension 55     Cervical SB 30 40   Cervical rotation 56 72          Special Test Results Symptoms and Time until Resolution of Symptoms   Dizziness Handicap Inventory  24% disability     BERTHA Held assessment today                           VOMS Not Tested Headache 0-10 Dizziness 0-10 Nausea 0-10 Fogginess 0-10 Comments   Baseline Symptoms:   7.5/10 0 0 5     Smooth Pursuits   7.5/7.5 1/1 0/0. 5.5/5.5 increased difficulty with eye tracking, nystagmus noted with horizontal and S pattern with vertical tracking   Saccades-Horizontal      7.5 1 0 6     Saccades-Vertical   7.5 0 0 5.5     Convergence (near Point)   8 1 0 5.5 4 cm each trial   Held visual motion sensitivity and VOR      RESTRICTIONS/PRECAUTIONS: none    INTERVENTIONS:      Therapeutic Ex: 12 min Sets/sec Reps Notes   UBE 1 4    T- band Row/pinch 1 15 Shaky, black   T- band lower pinch 1 15 Shaky, black   Cervical rotation ball at wall 1 15 white   Upper cervical rotation stretch 30 4    Lateral taps 2 10 green   No money + cerv retraction at wall 2 10 blue   T/spine ext over roll 10 10    Quadruped w cerv retract + alt UE lifts 1 10 2 lb   Chin tuck 10 10    Seated scalene stretch 30 4    Prone swiss ball row 0  3 lb, bilat- cues form   Prone swiss ball sh ext 0  3 lb, bilat, cues form   Prone swiss ball T  1 15 2 lb bilat, bilat, cues form    Prone swiss ball Y 1 15 1 lb, bilat, cues form    Touchdown at wall with kneeling on BOSU ball 0  red band   Manual Intervention 18 min      Cerv mobs/manip 1 12 cervical C2-T1, mid and low cervical grade V   Thoracic mobs/manip 1 2    CT manip 1 2 seated   Rib mobilizations  2 L 1st rib   STM 1 2 L   Seated upper cervical mobilization 1 0 bilateral   Manual scalene stretch 30 0    NMR re-education: 20 min      Wall Postural re-ed with head rotation and ball 2 10 White ball   Smooth pursuits horizontal and vertical 2 10    Saccades horiz and vertical 2 10    Standing gaze stab with ball toss 2 10    Eye tracking with ball 2 10    Walking gaze stab with head turns and nods 2 10    Scanning environment 2 10    BIODEX- Random control 1 min  Level 8 (medium control and [place] 91%, 91%, 91%)         Therapeutic Exercise and NMR EXR  [x] (98125) Provided verbal/tactile cueing for activities related to strengthening, flexibility, endurance, ROM  for improvements in cervical, postural, scapular, scapulothoracic and UE control with self care, reaching, carrying, lifting, house/yardwork, driving/computer work. [x] (35646) Provided verbal/tactile cueing for activities related to improving balance, coordination, kinesthetic sense, posture, motor skill, proprioception  to assist with cervical, scapular, scapulothoracic and UE control with self care, reaching, carrying, lifting, house/yardwork, driving/computer work. Therapeutic Activities:    [] (68450 or 04187) Provided verbal/tactile cueing for activities related to improving balance, coordination, kinesthetic sense, posture, motor skill, proprioception and motor activation to allow for proper function of cervical, scapular, scapulothoracic and UE control with self care, carrying, lifting, driving/computer work.      Home Exercise Program:    [x] (24088) Reviewed/Progressed HEP activities related to strengthening, flexibility, endurance, ROM of cervical, scapular, scapulothoracic and UE control with self care, reaching, carrying, lifting, house/yardwork, driving/computer work  [] (96667) Reviewed/Progressed HEP activities related to improving balance, coordination, kinesthetic sense, posture, motor skill, proprioception of cervical, scapular, scapulothoracic and UE control with self care, reaching, carrying, lifting, house/yardwork, driving/computer work      Manual Treatments:  PROM / STM / Oscillations-Mobs:  G-I, II, III, IV (PA's, Inf., Post.)  [x] (48161) Provided manual therapy to mobilize soft tissue/joints of cervical/CT, scapular GHJ and UE for the purpose of decreasing headache, modulating pain, promoting relaxation,  increasing ROM, reducing/eliminating soft tissue swelling/inflammation/restriction, improving soft tissue extensibility and allowing for proper ROM for normal function with self care, reaching, carrying, lifting, house/yardwork, driving/computer work    Modalities:      Charges:  Timed Code Treatment Minutes: 50   Total Treatment Minutes: 50     [] CHRIS (LOW) 229 8558 (typically 20 minutes face-to-face)  [] EVAL (MOD) 59044 (typically 30 minutes face-to-face)  [] EVAL (HIGH) 46555 (typically 45 minutes face-to-face)  [] RE-EVAL     [x] SC(56279) x   1  [] Dry needle 1 or 2 Muscles (79770)  [x] NMR (90183) x   1  [] Dry needle 3+ Muscles (20032)  [x] Manual (38969) x 1      [] TA (95956) x     [] Mech Traction (54581)  [] ES(attended) (29370)     [] ES (un) (73725):   [] VASO (04946)  [] Other:      If BWC Please Indicate Time In/Out  CPT Code Time in Time out                                     GOALS: (updated goals)  Patient stated goal: decrease HA  [] Progressing: [] Met: [] Not Met: [] Adjusted    Therapist goals for Patient:   Short Term Goals: To be achieved in: 2 weeks  1. Independent in HEP and progression per patient tolerance, in order to prevent re-injury. [] Progressing: [] Met: [] Not Met: [] Adjusted  2. Patient will have a decrease in pain and concussion symptoms to facilitate improvement in movement, function, and ADLs as indicated by Functional Deficits. [] Progressing: [] Met: [] Not Met: [] Adjusted    Long Term Goals: To be achieved in: 8 weeks  1. Disability index score of 10% or less for the NDI to assist with reaching prior level of function. [] Progressing: [] Met: [] Not Met: [] Adjusted  2. Patient will demonstrate increased AROM to [facility] of cervical/thoracic spine to allow for proper joint functioning as indicated by patients Functional Deficits. [] Progressing: [] Met: [] Not Met: [] Adjusted  3. Patient will demonstrate an increase in postural awareness and control and activation of  Deep cervical stabilizers to allow for proper functional mobility as indicated by patients Functional Deficits. [] Progressing: [] Met: [] Not Met: [] Adjusted  4. Patient to have normalized concussion symptoms to facilitate participation in movement, function, ADLs. [] Progressing: [] Met: [] Not Met: [] Adjusted   5.  Patient will report being able to return to daily activities to include school work and working out without increased symptoms or restriction. (patient specific functional goal)    [] Progressing: [] Met: [] Not Met: [] Adjusted     ASSESSMENT:  Patient with some further improvement in cervical joint restriction. Good tolerance to all maniupulation of c/spine. No issues with any oculomotor exercises today and able to progress to standing and walking, as well as biodex. Further progressed postural stability with difficulty. Decreased reports of HA to 3/10 at conclusion. Treatment/Activity Tolerance:  [x] Patient tolerated treatment well [] Patient limited by fatique  [] Patient limited by pain  [] Patient limited by other medical complications  [] Other:     Overall Progression Towards Functional goals/ Treatment Progress Update:  [x] Patient is progressing as expected towards functional goals listed. [] Progression is slowed due to complexities/Impairments listed. [] Progression has been slowed due to co-morbidities. [] Plan just implemented, too soon to assess goals progression <30days   [] Goals require adjustment due to lack of progress  [] Patient is not progressing as expected and requires additional follow up with physician  [] Other    Prognosis for POC: [x] Good [] Fair  [] Poor    Patient requires continued skilled intervention: [x] Yes  [] No        PLAN:   [x] Continue per plan of care [] Alter current plan (see comments)  [] Plan of care initiated [] Hold pending MD visit [] Discharge    Electronically signed by: Ck Vallejo PT    Note: If patient does not return for scheduled/recommended follow up visits, this note will serve as a discharge from care along with the most recent update on progress.

## 2022-03-31 ENCOUNTER — HOSPITAL ENCOUNTER (OUTPATIENT)
Dept: PHYSICAL THERAPY | Age: 22
Setting detail: THERAPIES SERIES
Discharge: HOME | End: 2022-03-31
Payer: COMMERCIAL

## 2022-03-31 NOTE — FLOWSHEET NOTE
[place],  [facility]    Physical Therapy  Cancellation/No-show Note  Patient Name:  Cielo Cottrell  :  2000   Date:  3/31/2022  Cancelled visits to date: 0  No-shows to date: 1    For today's appointment patient:  []  Cancelled  []  Rescheduled appointment  [x]  No-show     Reason given by patient:  []  Patient ill  []  Conflicting appointment  []  No transportation    []  Conflict with work  [x]  No reason given  []  Other:     Comments:      Electronically signed by:  Azeem Link PT, DPT

## 2022-04-15 ENCOUNTER — HOSPITAL ENCOUNTER (OUTPATIENT)
Dept: PHYSICAL THERAPY | Age: 22
Setting detail: THERAPIES SERIES
Discharge: HOME | End: 2022-04-15
Payer: COMMERCIAL

## 2022-04-15 PROCEDURE — 97110 THERAPEUTIC EXERCISES: CPT

## 2022-04-15 PROCEDURE — 97140 MANUAL THERAPY 1/> REGIONS: CPT

## 2022-04-15 NOTE — FLOWSHEET NOTE
[place] [address] [address]  Phone: [phone] Fax: [phone]        Physical Therapy Treatment Note/ Progress Report:     Date:  4/15/2022    Patient Name:  Odell Zarate    :  2000  MRN: [id number]  Restrictions/Precautions:    Medical/Treatment Diagnosis Information:  · Diagnosis: concussion, post concussion HA  · Treatment Diagnosis: concussion S06.0X0A, neck pain V43.9  Insurance/Certification information:  PT Insurance Information: Aetna/[place]  Physician Information:  Referring Practitioner: Dr uClver Million of care signed (Y/N):     Date of Patient follow up with Physician:      Progress Report: [x]  Yes  []  No     Date Range for reporting period:  Beginning:3/14/2022  Ending: -    Progress report due (10 Rx/or 30 days whichever is less): 5/15/3919    Recertification due (POC duration/ or 90 days whichever is less): 2022    Visit # Insurance Allowable Auth Needed   3 (17 prior in ) Aetna Student []Yes    [x]No     Pain level:  4/10 HA     SUBJECTIVE:  Patient reports that she felt like she was doing better. Had a good time and not too much issue with her neck while she was in [place]. Since being back at school, noticing more HA and fogginess. Not having too much issue with concentration.  .      OBJECTIVE:    Observation:    Test measurements:  NDI 26% disability, DHI 24% disability         CERV ROM       Cervical Flexion 80     Cervical Extension 55     Cervical SB 30 40   Cervical rotation 56 72          Special Test Results Symptoms and Time until Resolution of Symptoms   Dizziness Handicap Inventory  24% disability     BERTHA Held assessment today                           VOMS Not Tested Headache 0-10 Dizziness 0-10 Nausea 0-10 Fogginess 0-10 Comments   Baseline Symptoms:   7.5/10 0 0 5     Smooth Pursuits   7.5/7.5 1/1 0/0. 5.5/5.5 increased difficulty with eye tracking, nystagmus noted with horizontal and S pattern with vertical tracking   Saccades-Horizontal      7.5 1 0 6     Saccades-Vertical   7.5 0 0 5.5     Convergence (near Point)   8 1 0 5.5 4 cm each trial   Held visual motion sensitivity and VOR      RESTRICTIONS/PRECAUTIONS: none    INTERVENTIONS:      Therapeutic Ex: 24 min Sets/sec Reps Notes   UBE 1 4    T- band Row/pinch 1 15 Shaky, green + red TB for cervical retraction   T- band lower pinch 1 15 Shaky,green + red TB  For cervical retraction   Cervical rotation ball at wall 1 15 white   Upper cervical rotation stretch 30 4    Lateral taps 2 10 green   No money + cerv retraction at wall 2 10 blue   T/spine ext over roll 10 10    Quadruped w cerv retract +red TB 1 10    Chin tuck 10 10    Seated scalene stretch 30 4    Prone swiss ball row 0  3 lb, bilat- cues form   Prone swiss ball sh ext 0  3 lb, bilat, cues form   Prone swiss ball T  1 15 2 lb bilat, bilat, cues form    Prone swiss ball Y 1 15 1 lb, bilat, cues form    Touchdown at wall with kneeling on BOSU ball 0  red band   Manual Intervention 16 min      Cerv mobs/manip 1 12 cervical C2-T1, mid and low cervical grade V   Thoracic mobs/manip 1 2    CT manip 1 2 seated   Rib mobilizations  2 L 1st rib   STM 1 2 L   Seated upper cervical mobilization 1 0 bilateral   Manual scalene stretch 30 0    NMR re-education: 5 min      Wall Postural re-ed with head rotation and ball 2 10 White ball   Smooth pursuits horizontal and vertical 2 10    Saccades horiz and vertical 2 10    Standing gaze stab with ball toss 2 10    Eye tracking with ball 2 10    Walking gaze stab with head turns and nods 2 10    Scanning environment 2 10    BIODEX- Random control 1 min  Level 8 (medium control and [place] 91%, 91%, 91%)         Therapeutic Exercise and NMR EXR  [x] (07703) Provided verbal/tactile cueing for activities related to strengthening, flexibility, endurance, ROM  for improvements in cervical, postural, scapular, scapulothoracic and UE control with self care, reaching, carrying, lifting, house/yardwork, driving/computer work. [x] (39854) Provided verbal/tactile cueing for activities related to improving balance, coordination, kinesthetic sense, posture, motor skill, proprioception  to assist with cervical, scapular, scapulothoracic and UE control with self care, reaching, carrying, lifting, house/yardwork, driving/computer work. Therapeutic Activities:    [] (89098 or 56337) Provided verbal/tactile cueing for activities related to improving balance, coordination, kinesthetic sense, posture, motor skill, proprioception and motor activation to allow for proper function of cervical, scapular, scapulothoracic and UE control with self care, carrying, lifting, driving/computer work.      Home Exercise Program:    [x] (38357) Reviewed/Progressed HEP activities related to strengthening, flexibility, endurance, ROM of cervical, scapular, scapulothoracic and UE control with self care, reaching, carrying, lifting, house/yardwork, driving/computer work  [] (19997) Reviewed/Progressed HEP activities related to improving balance, coordination, kinesthetic sense, posture, motor skill, proprioception of cervical, scapular, scapulothoracic and UE control with self care, reaching, carrying, lifting, house/yardwork, driving/computer work      Manual Treatments:  PROM / STM / Oscillations-Mobs:  G-I, II, III, IV (PA's, Inf., Post.)  [x] (07680) Provided manual therapy to mobilize soft tissue/joints of cervical/CT, scapular GHJ and UE for the purpose of decreasing headache, modulating pain, promoting relaxation,  increasing ROM, reducing/eliminating soft tissue swelling/inflammation/restriction, improving soft tissue extensibility and allowing for proper ROM for normal function with self care, reaching, carrying, lifting, house/yardwork, driving/computer work    Modalities:      Charges:  Timed Code Treatment Minutes: 45   Total Treatment Minutes: 45     [] EVAL (LOW) 14299 (typically 20 minutes face-to-face)  [] EVAL (MOD) 59707 (typically 30 minutes face-to-face)  [] EVAL (HIGH) 78807 (typically 45 minutes face-to-face)  [] RE-EVAL     [x] OH(47257) x   2  [] Dry needle 1 or 2 Muscles (78092)  [] NMR (09151) x     [] Dry needle 3+ Muscles (99137)  [x] Manual (22387) x 1      [] TA (91528) x     [] Mech Traction (41530)  [] ES(attended) (18824)     [] ES (un) (91175):   [] VASO (14697)  [] Other:      If [facility] Please Indicate Time In/Out  CPT Code Time in Time out                                     GOALS: (updated goals)  Patient stated goal: decrease HA  [] Progressing: [] Met: [] Not Met: [] Adjusted    Therapist goals for Patient:   Short Term Goals: To be achieved in: 2 weeks  1. Independent in HEP and progression per patient tolerance, in order to prevent re-injury. [] Progressing: [] Met: [] Not Met: [] Adjusted  2. Patient will have a decrease in pain and concussion symptoms to facilitate improvement in movement, function, and ADLs as indicated by Functional Deficits. [] Progressing: [] Met: [] Not Met: [] Adjusted    Long Term Goals: To be achieved in: 8 weeks  1. Disability index score of 10% or less for the NDI to assist with reaching prior level of function. [] Progressing: [] Met: [] Not Met: [] Adjusted  2. Patient will demonstrate increased AROM to [facility] of cervical/thoracic spine to allow for proper joint functioning as indicated by patients Functional Deficits. [] Progressing: [] Met: [] Not Met: [] Adjusted  3. Patient will demonstrate an increase in postural awareness and control and activation of  Deep cervical stabilizers to allow for proper functional mobility as indicated by patients Functional Deficits. [] Progressing: [] Met: [] Not Met: [] Adjusted  4. Patient to have normalized concussion symptoms to facilitate participation in movement, function, ADLs. [] Progressing: [] Met: [] Not Met: [] Adjusted   5.  Patient will report being able to return to daily activities to include school work and working out without increased symptoms or restriction. (patient specific functional goal)    [] Progressing: [] Met: [] Not Met: [] Adjusted     ASSESSMENT:  Patient with mild restriction in mid to low cervical and t/spine. Good tolerance to all maniupulation of c/spine. No issues with any oculomotor exercises today and reporting feeling better than it did at last session. Did well with all strengthening but needing cues for scap retraction and cervical retraction. Patient fatigues quickly with periscapular and cervical strength. Decreased reports of HA to 3/10 at conclusion. Treatment/Activity Tolerance:  [x] Patient tolerated treatment well [] Patient limited by fatique  [] Patient limited by pain  [] Patient limited by other medical complications  [] Other:     Overall Progression Towards Functional goals/ Treatment Progress Update:  [x] Patient is progressing as expected towards functional goals listed. [] Progression is slowed due to complexities/Impairments listed. [] Progression has been slowed due to co-morbidities. [] Plan just implemented, too soon to assess goals progression <30days   [] Goals require adjustment due to lack of progress  [] Patient is not progressing as expected and requires additional follow up with physician  [] Other    Prognosis for POC: [x] Good [] Fair  [] Poor    Patient requires continued skilled intervention: [x] Yes  [] No        PLAN:   [x] Continue per plan of care [] Alter current plan (see comments)  [] Plan of care initiated [] Hold pending MD visit [] Discharge    Electronically signed by: Subhash Best PT    Note: If patient does not return for scheduled/recommended follow up visits, this note will serve as a discharge from care along with the most recent update on progress.

## 2022-04-21 ENCOUNTER — HOSPITAL ENCOUNTER (OUTPATIENT)
Dept: PHYSICAL THERAPY | Age: 22
Setting detail: THERAPIES SERIES
Discharge: HOME | End: 2022-04-21
Payer: COMMERCIAL

## 2022-04-21 PROCEDURE — 20560 NDL INSJ W/O NJX 1 OR 2 MUSC: CPT

## 2022-04-21 PROCEDURE — 97110 THERAPEUTIC EXERCISES: CPT

## 2022-04-21 PROCEDURE — 97032 APPL MODALITY 1+ESTIM EA 15: CPT

## 2022-04-21 PROCEDURE — 97140 MANUAL THERAPY 1/> REGIONS: CPT

## 2022-04-21 NOTE — FLOWSHEET NOTE
[place] [address] [address]  Phone: [phone] Fax: [phone]        Physical Therapy Treatment Note/ Progress Report:     Date:  2022    Patient Name:  Shelley Leyva    :  2000  MRN: [id number]  Restrictions/Precautions:    Medical/Treatment Diagnosis Information:  · Diagnosis: concussion, post concussion HA  · Treatment Diagnosis: concussion S06.0X0A, neck pain Z40.3  Insurance/Certification information:  PT Insurance Information: Aetna/[place]  Physician Information:  Referring Practitioner: Dr Rosemary Robb of care signed (Y/N):     Date of Patient follow up with Physician:      Progress Report: [x]  Yes  []  No     Date Range for reporting period:  Beginning:3/14/2022  Ending: -    Progress report due (10 Rx/or 30 days whichever is less): 1482    Recertification due (POC duration/ or 90 days whichever is less): 2022    Visit # Insurance Allowable Auth Needed   4 (17 prior in ) Aetna Student []Yes    [x]No     Pain level:  4/10 HA     SUBJECTIVE:  Patient reports that she felt good for 2 days after last session; but neck tightened back up and she has had HA for the past 3-4 days. Schoolwork has been more difficult due to HA.  Patient running 15 min late today      OBJECTIVE:    Observation:    Test measurements:  NDI 26% disability, DHI 24% disability         CERV ROM       Cervical Flexion 80     Cervical Extension 55     Cervical SB 30 40   Cervical rotation 56 72          Special Test Results Symptoms and Time until Resolution of Symptoms   Dizziness Handicap Inventory  24% disability     BERTHA Held assessment today                           VOMS Not Tested Headache 0-10 Dizziness 0-10 Nausea 0-10 Fogginess 0-10 Comments   Baseline Symptoms:   7.5/10 0 0 5     Smooth Pursuits   7.5/7.5 1/1 0/0. 5.5/5.5 increased difficulty with eye tracking, nystagmus noted with horizontal and S pattern with vertical tracking   Saccades-Horizontal      7.5 1 0 6     Saccades-Vertical   7.5 0 0 5.5     Convergence (near Point)   8 1 0 5.5 4 cm each trial   Held visual motion sensitivity and VOR      RESTRICTIONS/PRECAUTIONS: none    INTERVENTIONS:      Therapeutic Ex: 15 min Sets/sec Reps Notes   UBE 1 4    T- band Row/pinch 1 15 Shaky, green + red TB for cervical retraction   T- band lower pinch 1 15 Shaky,green + red TB  For cervical retraction   Cervical rotation ball at wall 0  white   Upper cervical rotation stretch 30 4    Lateral taps 0  green   No money + cerv retraction at wall 0  blue   T/spine ext over roll 10 10    Quadruped w cerv retract +red TB 1 10    Chin tuck 10 10    Seated scalene stretch 30 4    Prone swiss ball row 0  3 lb, bilat- cues form   Prone swiss ball sh ext 0  3 lb, bilat, cues form   Prone swiss ball T  1 15 2 lb bilat, bilat, cues form    Prone swiss ball Y 1 15 1 lb, bilat, cues form    Touchdown at wall with kneeling on BOSU ball 0  red band   Manual Intervention 10 min      Cerv mobs/manip 1 8 cervical C2-T1, mid and low cervical grade V   Thoracic mobs/manip 1 2    CT manip 1 2 seated   Rib mobilizations  0 L 1st rib   STM 1 0 L   Seated upper cervical mobilization 1 0 bilateral   Manual scalene stretch 30 0    NMR re-education: 0 min      Wall Postural re-ed with head rotation and ball 2 10 White ball   Smooth pursuits horizontal and vertical 2 10    Saccades horiz and vertical 2 10    Standing gaze stab with ball toss 2 10    Eye tracking with ball 2 10    Walking gaze stab with head turns and nods 2 10    Scanning environment 2 10    BIODEX- Random control 1 min  Level 8 (medium control and [place] 91%, 91%, 91%)   DN  5    ESTIM  10        Spoke with   regarding the use of Dry Needling     Dry needling manual therapy: consisted on the placement of 8 neec3/4, c4/5 and c5/6; bilat UT. dles in the following muscles:  bilat multifidus C3/4, C4/5, C5/6, C6/7,  A 40 mm needle was inserted, piston, rotated, and coned to produce intramuscular mobilization. These techniques were used to restore functional range of motion, reduce muscle spasm and induce healing in the corresponding musculature. (61495)  Clean Technique was utilized today while applying Dry needling treatment. The treatment sites where cleaned with 70% solution of  isopropyl alcohol . The PT washed their hands and utilized treatment gloves along with hand  prior to inserting the needles. All needles where removed and discarded in the appropriate sharps container. MD has given verbal and/or written approval for this treatment. Attended low frequency (1-20Hz) electrical stimulation was utilized in conjunction with Dry Needling:  the Estim was manipulated between all above needles for a period of 10 min. at 4-5 volts. The low frequency electrical stimulation was used to help reduce muscle spasm and help to interrupt /[place] the pain cycle. (55494)       Therapeutic Exercise and NMR EXR  [x] (62487) Provided verbal/tactile cueing for activities related to strengthening, flexibility, endurance, ROM  for improvements in cervical, postural, scapular, scapulothoracic and UE control with self care, reaching, carrying, lifting, house/yardwork, driving/computer work. [x] (40324) Provided verbal/tactile cueing for activities related to improving balance, coordination, kinesthetic sense, posture, motor skill, proprioception  to assist with cervical, scapular, scapulothoracic and UE control with self care, reaching, carrying, lifting, house/yardwork, driving/computer work.     Therapeutic Activities:    [] (81674 or 04977) Provided verbal/tactile cueing for activities related to improving balance, coordination, kinesthetic sense, posture, motor skill, proprioception and motor activation to allow for proper function of cervical, scapular, scapulothoracic and UE control with self care, carrying, lifting, driving/computer work. Home Exercise Program:    [x] (13379) Reviewed/Progressed HEP activities related to strengthening, flexibility, endurance, ROM of cervical, scapular, scapulothoracic and UE control with self care, reaching, carrying, lifting, house/yardwork, driving/computer work  [] (19330) Reviewed/Progressed HEP activities related to improving balance, coordination, kinesthetic sense, posture, motor skill, proprioception of cervical, scapular, scapulothoracic and UE control with self care, reaching, carrying, lifting, house/yardwork, driving/computer work      Manual Treatments:  PROM / STM / Oscillations-Mobs:  G-I, II, III, IV (PA's, Inf., Post.)  [x] (28088) Provided manual therapy to mobilize soft tissue/joints of cervical/CT, scapular GHJ and UE for the purpose of decreasing headache, modulating pain, promoting relaxation,  increasing ROM, reducing/eliminating soft tissue swelling/inflammation/restriction, improving soft tissue extensibility and allowing for proper ROM for normal function with self care, reaching, carrying, lifting, house/yardwork, driving/computer work    Modalities:      Charges:  Timed Code Treatment Minutes: 25   Total Treatment Minutes: 40     [] EVAL (LOW) 08913 (typically 20 minutes face-to-face)  [] EVAL (MOD) 13541 (typically 30 minutes face-to-face)  [] EVAL (HIGH) 21641 (typically 45 minutes face-to-face)  [] RE-EVAL     [x] [place](90080) x   1  [x] Dry needle 1 or 2 Muscles (87390)  [] NMR (59814) x     [] Dry needle 3+ Muscles (72171)  [x] Manual (41753) x 1      [] TA (90879) x     [] Mech Traction (92317)  [x] ES(attended) (38340)     [] ES (un) (21040):   [] VASO (02718)  [] Other:      If [facility] Please Indicate Time In/Out  CPT Code Time in Time out                                     GOALS: (updated goals)  Patient stated goal: decrease HA  [] Progressing: [] Met: [] Not Met: [] Adjusted    Therapist goals for Patient:   Short Term Goals: To be achieved in: 2 weeks  1. Independent in HEP and progression per patient tolerance, in order to prevent re-injury. [] Progressing: [] Met: [] Not Met: [] Adjusted  2. Patient will have a decrease in pain and concussion symptoms to facilitate improvement in movement, function, and ADLs as indicated by Functional Deficits. [] Progressing: [] Met: [] Not Met: [] Adjusted    Long Term Goals: To be achieved in: 8 weeks  1. Disability index score of 10% or less for the NDI to assist with reaching prior level of function. [] Progressing: [] Met: [] Not Met: [] Adjusted  2. Patient will demonstrate increased AROM to [facility] of cervical/thoracic spine to allow for proper joint functioning as indicated by patients Functional Deficits. [] Progressing: [] Met: [] Not Met: [] Adjusted  3. Patient will demonstrate an increase in postural awareness and control and activation of  Deep cervical stabilizers to allow for proper functional mobility as indicated by patients Functional Deficits. [] Progressing: [] Met: [] Not Met: [] Adjusted  4. Patient to have normalized concussion symptoms to facilitate participation in movement, function, ADLs. [] Progressing: [] Met: [] Not Met: [] Adjusted   5. Patient will report being able to return to daily activities to include school work and working out without increased symptoms or restriction. (patient specific functional goal)    [] Progressing: [] Met: [] Not Met: [] Adjusted     ASSESSMENT:  Patient with increased restriction in R cervical spine and increased soft tissue restriction along cervical paraspinals. Eager to DN for improved relief. Tolerated DN well with good improvement in soft tissue restriction and reduction in HA. Patient did well with all strengthening but needing cues for scap retraction and cervical retraction. Patient fatigues quickly with periscapular and cervical strength.       Treatment/Activity Tolerance:  [x] Patient tolerated treatment well [] Patient limited by art  [] Patient limited by pain  [] Patient limited by other medical complications  [] Other:     Overall Progression Towards Functional goals/ Treatment Progress Update:  [x] Patient is progressing as expected towards functional goals listed. [] Progression is slowed due to complexities/Impairments listed. [] Progression has been slowed due to co-morbidities. [] Plan just implemented, too soon to assess goals progression <30days   [] Goals require adjustment due to lack of progress  [] Patient is not progressing as expected and requires additional follow up with physician  [] Other    Prognosis for POC: [x] Good [] Fair  [] Poor    Patient requires continued skilled intervention: [x] Yes  [] No        PLAN:   [x] Continue per plan of care [] Alter current plan (see comments)  [] Plan of care initiated [] Hold pending MD visit [] Discharge    Electronically signed by: Bryan Lehman PT    Note: If patient does not return for scheduled/recommended follow up visits, this note will serve as a discharge from care along with the most recent update on progress.

## 2022-04-28 ENCOUNTER — HOSPITAL ENCOUNTER (OUTPATIENT)
Dept: PHYSICAL THERAPY | Age: 22
Setting detail: THERAPIES SERIES
Discharge: HOME | End: 2022-04-28
Payer: COMMERCIAL

## 2022-04-28 PROCEDURE — 97110 THERAPEUTIC EXERCISES: CPT

## 2022-04-28 PROCEDURE — 20560 NDL INSJ W/O NJX 1 OR 2 MUSC: CPT

## 2022-04-28 PROCEDURE — 97032 APPL MODALITY 1+ESTIM EA 15: CPT

## 2022-04-28 PROCEDURE — 97140 MANUAL THERAPY 1/> REGIONS: CPT

## 2022-04-28 NOTE — FLOWSHEET NOTE
[place] [address] [facility] [address]  Phone: [phone] Fax: [phone]        Physical Therapy Treatment Note/ Progress Report:     Date:  2022    Patient Name:  Seven Leblanc    :  2000  MRN: [id number]  Restrictions/Precautions:    Medical/Treatment Diagnosis Information:  · Diagnosis: concussion, post concussion HA  · Treatment Diagnosis: concussion S06.0X0A, neck pain T74.8  Insurance/Certification information:  PT Insurance Information: Aetna/[place]  Physician Information:  Referring Practitioner: Dr Efraín Acuna of care signed (Y/N):     Date of Patient follow up with Physician:      Progress Report: [x]  Yes  []  No     Date Range for reporting period:  Beginning:3/14/2022  Ending: -    Progress report due (10 Rx/or 30 days whichever is less):     Recertification due (POC duration/ or 90 days whichever is less): 2022    Visit # Insurance Allowable Auth Needed   5 (17 prior in ) Aetna Student []Yes    [x]No     Pain level:  2/10 HA     SUBJECTIVE:  Patient reports that she felt good after DN last session. States that she felt pretty good over the weekend. Doesn't feel \"weird\" anymore, neck just feels stiff. Agreeable to DN again today.      OBJECTIVE:    Observation:    Test measurements:  NDI 26% disability, DHI 24% disability         CERV ROM       Cervical Flexion 80     Cervical Extension 55     Cervical SB 30 40   Cervical rotation 56 72          Special Test Results Symptoms and Time until Resolution of Symptoms   Dizziness Handicap Inventory  24% disability     BERTHA Held assessment today                           VOMS Not Tested Headache 0-10 Dizziness 0-10 Nausea 0-10 Fogginess 0-10 Comments   Baseline Symptoms:   7.5/10 0 0 5     Smooth Pursuits   7.5/7.5 1/1 0/0. 5.5/5.5 increased difficulty with eye tracking, nystagmus noted with horizontal and S pattern with vertical tracking Saccades-Horizontal      7.5 1 0 6     Saccades-Vertical   7.5 0 0 5.5     Convergence (near Point)   8 1 0 5.5 4 cm each trial   Held visual motion sensitivity and VOR      RESTRICTIONS/PRECAUTIONS: none    INTERVENTIONS:      Therapeutic Ex: 15 min Sets/sec Reps Notes   UBE 1 0    T- band Row/pinch 1 15 Shaky, green + red TB for cervical retraction   T- band lower pinch 1 15 Shaky,green + red TB  For cervical retraction   Cervical rotation ball at wall 2 10 white   Upper cervical rotation stretch 30 4    Lateral taps 0  green   No money + cerv retraction at wall 0  blue   T/spine ext over roll 10 10    Quadruped w cerv retract +red TB 1 10    Chin tuck 10 10    Seated scalene stretch 30 4    Prone swiss ball row 0  3 lb, bilat- cues form   Prone swiss ball sh ext 0  3 lb, bilat, cues form   Prone swiss ball T  1 15 2 lb bilat, bilat, cues form    Prone swiss ball Y 1 15 1 lb, bilat, cues form    Touchdown at wall with kneeling on BOSU ball 0  red band   Manual Intervention 17 min      Cerv mobs/manip 1 8 cervical C2-T1, mid and low cervical grade V   Thoracic mobs/manip 1 2    CT manip 1 2 seated   Rib mobilizations  0 L 1st rib   STM 1 5 L   Seated upper cervical mobilization 1 0 bilateral   Manual scalene stretch 30 0    NMR re-education: 0 min      Wall Postural re-ed with head rotation and ball 2 10 White ball   Smooth pursuits horizontal and vertical 2 10    Saccades horiz and vertical 2 10    Standing gaze stab with ball toss 2 10    Eye tracking with ball 2 10    Walking gaze stab with head turns and nods 2 10    Scanning environment 2 10    BIODEX- Random control 1 min  Level 8 (medium control and [place] 91%, 91%, 91%)   DN  5    ESTIM  10        Spoke with   regarding the use of Dry Needling     Dry needling manual therapy: consisted on the placement of 8 needles in the following muscles:  c4/5, c5/6 and c6/4; bilat lateral insertion, ,  A 40 mm needle was inserted, piston, rotated, and coned to produce intramuscular mobilization. These techniques were used to restore functional range of motion, reduce muscle spasm and induce healing in the corresponding musculature. (79584)  Clean Technique was utilized today while applying Dry needling treatment. The treatment sites where cleaned with 70% solution of  isopropyl alcohol . The PT washed their hands and utilized treatment gloves along with hand  prior to inserting the needles. All needles where removed and discarded in the appropriate sharps container. MD has given verbal and/or written approval for this treatment. Attended low frequency (1-20Hz) electrical stimulation was utilized in conjunction with Dry Needling:  the Estim was manipulated between all above needles for a period of 10 min. at 4-5 volts. The low frequency electrical stimulation was used to help reduce muscle spasm and help to interrupt /[place] the pain cycle. (75278)       Therapeutic Exercise and NMR EXR  [x] (22732) Provided verbal/tactile cueing for activities related to strengthening, flexibility, endurance, ROM  for improvements in cervical, postural, scapular, scapulothoracic and UE control with self care, reaching, carrying, lifting, house/yardwork, driving/computer work. [x] (19006) Provided verbal/tactile cueing for activities related to improving balance, coordination, kinesthetic sense, posture, motor skill, proprioception  to assist with cervical, scapular, scapulothoracic and UE control with self care, reaching, carrying, lifting, house/yardwork, driving/computer work. Therapeutic Activities:    [] (11816 or 60619) Provided verbal/tactile cueing for activities related to improving balance, coordination, kinesthetic sense, posture, motor skill, proprioception and motor activation to allow for proper function of cervical, scapular, scapulothoracic and UE control with self care, carrying, lifting, driving/computer work.      Home Exercise Program:    [x] (67762) Reviewed/Progressed HEP activities related to strengthening, flexibility, endurance, ROM of cervical, scapular, scapulothoracic and UE control with self care, reaching, carrying, lifting, house/yardwork, driving/computer work  [] (56095) Reviewed/Progressed HEP activities related to improving balance, coordination, kinesthetic sense, posture, motor skill, proprioception of cervical, scapular, scapulothoracic and UE control with self care, reaching, carrying, lifting, house/yardwork, driving/computer work      Manual Treatments:  PROM / STM / Oscillations-Mobs:  G-I, II, III, IV (PA's, Inf., Post.)  [x] (51093) Provided manual therapy to mobilize soft tissue/joints of cervical/CT, scapular GHJ and UE for the purpose of decreasing headache, modulating pain, promoting relaxation,  increasing ROM, reducing/eliminating soft tissue swelling/inflammation/restriction, improving soft tissue extensibility and allowing for proper ROM for normal function with self care, reaching, carrying, lifting, house/yardwork, driving/computer work    Modalities:      Charges:  Timed Code Treatment Minutes: 35   Total Treatment Minutes: 50     [] EVAL (LOW) 20035 (typically 20 minutes face-to-face)  [] EVAL (MOD) 41302 (typically 30 minutes face-to-face)  [] EVAL (HIGH) 24202 (typically 45 minutes face-to-face)  [] RE-EVAL     [x] UD(63912) x   1  [x] Dry needle 1 or 2 Muscles (41881)  [] NMR (21574) x     [] Dry needle 3+ Muscles (80527)  [x] Manual (42082) x 1      [] TA (61084) x     [] Mech Traction (26857)  [x] ES(attended) (57385)     [] ES (un) (27591):   [] VASO (46871)  [] Other:      If BW Please Indicate Time In/Out  CPT Code Time in Time out                                     GOALS: (updated goals)  Patient stated goal: decrease HA  [] Progressing: [] Met: [] Not Met: [] Adjusted    Therapist goals for Patient:   Short Term Goals: To be achieved in: 2 weeks  1.  Independent in HEP and progression per patient tolerance, in order to prevent re-injury. [] Progressing: [] Met: [] Not Met: [] Adjusted  2. Patient will have a decrease in pain and concussion symptoms to facilitate improvement in movement, function, and ADLs as indicated by Functional Deficits. [] Progressing: [] Met: [] Not Met: [] Adjusted    Long Term Goals: To be achieved in: 8 weeks  1. Disability index score of 10% or less for the NDI to assist with reaching prior level of function. [] Progressing: [] Met: [] Not Met: [] Adjusted  2. Patient will demonstrate increased AROM to [facility] of cervical/thoracic spine to allow for proper joint functioning as indicated by patients Functional Deficits. [] Progressing: [] Met: [] Not Met: [] Adjusted  3. Patient will demonstrate an increase in postural awareness and control and activation of  Deep cervical stabilizers to allow for proper functional mobility as indicated by patients Functional Deficits. [] Progressing: [] Met: [] Not Met: [] Adjusted  4. Patient to have normalized concussion symptoms to facilitate participation in movement, function, ADLs. [] Progressing: [] Met: [] Not Met: [] Adjusted   5. Patient will report being able to return to daily activities to include school work and working out without increased symptoms or restriction. (patient specific functional goal)    [] Progressing: [] Met: [] Not Met: [] Adjusted     ASSESSMENT:  Patient with increased restriction in bilat mid cervical spine and increased soft tissue restriction along cervical paraspinals. Agreeable to DN again. Tolerated DN well with good improvement in soft tissue restriction and reduction in HA and improved cervical spine mobility. Patient did well with all strengthening but needing cues for scap retraction and cervical retraction. Patient fatigues quickly with periscapular and cervical strength.       Treatment/Activity Tolerance:  [x] Patient tolerated treatment well [] Patient limited by fatique  [] Patient limited by pain  [] Patient limited by other medical complications  [] Other:     Overall Progression Towards Functional goals/ Treatment Progress Update:  [x] Patient is progressing as expected towards functional goals listed. [] Progression is slowed due to complexities/Impairments listed. [] Progression has been slowed due to co-morbidities. [] Plan just implemented, too soon to assess goals progression <30days   [] Goals require adjustment due to lack of progress  [] Patient is not progressing as expected and requires additional follow up with physician  [] Other    Prognosis for POC: [x] Good [] Fair  [] Poor    Patient requires continued skilled intervention: [x] Yes  [] No        PLAN:   [x] Continue per plan of care [] Alter current plan (see comments)  [] Plan of care initiated [] Hold pending MD visit [] Discharge    Electronically signed by: Jose Miguel Zavala PT    Note: If patient does not return for scheduled/recommended follow up visits, this note will serve as a discharge from care along with the most recent update on progress.

## 2022-05-04 ENCOUNTER — HOSPITAL ENCOUNTER (OUTPATIENT)
Dept: PHYSICAL THERAPY | Age: 22
Setting detail: THERAPIES SERIES
Discharge: HOME | End: 2022-05-04
Payer: COMMERCIAL

## 2022-05-04 PROCEDURE — 97140 MANUAL THERAPY 1/> REGIONS: CPT

## 2022-05-04 PROCEDURE — 97110 THERAPEUTIC EXERCISES: CPT

## 2022-05-04 NOTE — FLOWSHEET NOTE
[facility] [address] [facility] [address]  Phone: [phone] Fax: [phone]        Physical Therapy Treatment Note/ Progress Report:     Date:  2022    Patient Name:  Lucille Isidro    :  2000  MRN: [id number]  Restrictions/Precautions:    Medical/Treatment Diagnosis Information:  · Diagnosis: concussion, post concussion HA  · Treatment Diagnosis: concussion S06.0X0A, neck pain F48.9  Insurance/Certification information:  PT Insurance Information: Aetna/[place]  Physician Information:  Referring Practitioner: Dr Jj Swift of care signed (Y/N):     Date of Patient follow up with Physician:      Progress Report: [x]  Yes  []  No     Date Range for reporting period:  Beginning:3/14/2022  Ending: -    Progress report due (10 Rx/or 30 days whichever is less):     Recertification due (POC duration/ or 90 days whichever is less): 2022    Visit # Insurance Allowable Auth Needed   6 (17 prior in ) Aetna Student []Yes    [x]No     Pain level:  3/10 HA     SUBJECTIVE:  Patient reports that she felt good improvement after last session which lasted a few days. Did a lot of studying and computer work over the weekend and having a slight increase in HA over the weekend, which is slightly better today. Doesn't feel as tight, but just having HA.       OBJECTIVE:    Observation:    Test measurements:  NDI 26% disability, DHI 24% disability         CERV ROM       Cervical Flexion 80     Cervical Extension 55     Cervical SB 30 40   Cervical rotation 56 72          Special Test Results Symptoms and Time until Resolution of Symptoms   Dizziness Handicap Inventory  24% disability     BERTHA Held assessment today                           VOMS Not Tested Headache 0-10 Dizziness 0-10 Nausea 0-10 Fogginess 0-10 Comments   Baseline Symptoms:   7.5/10 0 0 5     Smooth Pursuits   7.5/7.5 1/1 0/0. 5.5/5.5 increased difficulty with eye tracking, nystagmus noted with horizontal and S pattern with vertical tracking   Saccades-Horizontal      7.5 1 0 6     Saccades-Vertical   7.5 0 0 5.5     Convergence (near Point)   8 1 0 5.5 4 cm each trial   Held visual motion sensitivity and VOR      RESTRICTIONS/PRECAUTIONS: none    INTERVENTIONS:      Therapeutic Ex: 15 min Sets/sec Reps Notes   UBE 1 0    T- band Row/pinch 1 15 Shaky, green + red TB for cervical retraction   T- band lower pinch 1 15 Shaky,green + red TB  For cervical retraction   Cervical rotation ball at wall 2 10 white   Upper cervical rotation stretch 30 4    Lateral taps 0  green   No money + cerv retraction at wall 0  blue   T/spine ext over roll 10 10    Quadruped w cerv retract +red TB 1 10    Supine chin tuck with rotation stretch 30 3 bilat   Chin tuck 10 10    Seated scalene stretch 30 4    Prone swiss ball row 0  3 lb, bilat- cues form   Prone swiss ball sh ext 0  3 lb, bilat, cues form   Prone swiss ball T  1 15 2 lb bilat, bilat, cues form    Prone swiss ball Y 1 15 1 lb, bilat, cues form    Touchdown at wall with kneeling on BOSU ball 0  red band   Manual Intervention 17 min      Cerv mobs/manip 1 8 cervical C2-T1   Thoracic mobs/manip 1 2    CT manip 1 2 seated   Rib mobilizations  0 L 1st rib   STM 1 5 L   Seated upper cervical mobilization 1 0 bilateral   Manual scalene stretch 30 0    NMR re-education: 0 min      Wall Postural re-ed with head rotation and ball 2 10 White ball   Smooth pursuits horizontal and vertical 2 10    Saccades horiz and vertical 2 10    Standing gaze stab with ball toss 2 10    Eye tracking with ball 2 10    Walking gaze stab with head turns and nods 2 10    Scanning environment 2 10    BIODEX- Random control 1 min  Level 8 (medium control and [place] 91%, 91%, 91%)         Therapeutic Exercise and NMR EXR  [x] (05384) Provided verbal/tactile cueing for activities related to strengthening, flexibility, endurance, ROM  for improvements in cervical, postural, scapular, scapulothoracic and UE control with self care, reaching, carrying, lifting, house/yardwork, driving/computer work. [x] (23552) Provided verbal/tactile cueing for activities related to improving balance, coordination, kinesthetic sense, posture, motor skill, proprioception  to assist with cervical, scapular, scapulothoracic and UE control with self care, reaching, carrying, lifting, house/yardwork, driving/computer work. Therapeutic Activities:    [] (42232 or 57390) Provided verbal/tactile cueing for activities related to improving balance, coordination, kinesthetic sense, posture, motor skill, proprioception and motor activation to allow for proper function of cervical, scapular, scapulothoracic and UE control with self care, carrying, lifting, driving/computer work.      Home Exercise Program:    [x] (19804) Reviewed/Progressed HEP activities related to strengthening, flexibility, endurance, ROM of cervical, scapular, scapulothoracic and UE control with self care, reaching, carrying, lifting, house/yardwork, driving/computer work  [] (24503) Reviewed/Progressed HEP activities related to improving balance, coordination, kinesthetic sense, posture, motor skill, proprioception of cervical, scapular, scapulothoracic and UE control with self care, reaching, carrying, lifting, house/yardwork, driving/computer work      Manual Treatments:  PROM / STM / Oscillations-Mobs:  G-I, II, III, IV (PA's, Inf., Post.)  [x] (76439) Provided manual therapy to mobilize soft tissue/joints of cervical/CT, scapular GHJ and UE for the purpose of decreasing headache, modulating pain, promoting relaxation,  increasing ROM, reducing/eliminating soft tissue swelling/inflammation/restriction, improving soft tissue extensibility and allowing for proper ROM for normal function with self care, reaching, carrying, lifting, house/yardwork, driving/computer work    Modalities:      Charges:  Timed Code Treatment Minutes: 33 Total Treatment Minutes: 35     [] EVAL (LOW) 86948 (typically 20 minutes face-to-face)  [] EVAL (MOD) 35763 (typically 30 minutes face-to-face)  [] EVAL (HIGH) 77270 (typically 45 minutes face-to-face)  [] RE-EVAL     [x] GE(98621) x   1  [] Dry needle 1 or 2 Muscles (88909)  [] NMR (34182) x     [] Dry needle 3+ Muscles (19758)  [x] Manual (23171) x 1      [] TA (56259) x     [] Mech Traction (37385)  [] ES(attended) (87383)     [] ES (un) (14012):   [] VASO (66325)  [] Other:      If BWC Please Indicate Time In/Out  CPT Code Time in Time out                                     GOALS: (updated goals)  Patient stated goal: decrease HA  [] Progressing: [] Met: [] Not Met: [] Adjusted    Therapist goals for Patient:   Short Term Goals: To be achieved in: 2 weeks  1. Independent in HEP and progression per patient tolerance, in order to prevent re-injury. [] Progressing: [] Met: [] Not Met: [] Adjusted  2. Patient will have a decrease in pain and concussion symptoms to facilitate improvement in movement, function, and ADLs as indicated by Functional Deficits. [] Progressing: [] Met: [] Not Met: [] Adjusted    Long Term Goals: To be achieved in: 8 weeks  1. Disability index score of 10% or less for the NDI to assist with reaching prior level of function. [] Progressing: [] Met: [] Not Met: [] Adjusted  2. Patient will demonstrate increased AROM to [facility] of cervical/thoracic spine to allow for proper joint functioning as indicated by patients Functional Deficits. [] Progressing: [] Met: [] Not Met: [] Adjusted  3. Patient will demonstrate an increase in postural awareness and control and activation of  Deep cervical stabilizers to allow for proper functional mobility as indicated by patients Functional Deficits. [] Progressing: [] Met: [] Not Met: [] Adjusted  4. Patient to have normalized concussion symptoms to facilitate participation in movement, function, ADLs.    [] Progressing: [] Met: [] Not Met: [] Adjusted   5. Patient will report being able to return to daily activities to include school work and working out without increased symptoms or restriction. (patient specific functional goal)    [] Progressing: [] Met: [] Not Met: [] Adjusted     ASSESSMENT:  Patient with increased restriction in bilat mid cervical spine, although not as tight as last session. Soft tissue restriction along cervical paraspinals. Patient did well with all strengthening but needing cues for scap retraction and cervical retraction. Patient fatigues quickly with periscapular and cervical strength. Treatment/Activity Tolerance:  [x] Patient tolerated treatment well [] Patient limited by fatique  [] Patient limited by pain  [] Patient limited by other medical complications  [] Other:     Overall Progression Towards Functional goals/ Treatment Progress Update:  [x] Patient is progressing as expected towards functional goals listed. [] Progression is slowed due to complexities/Impairments listed. [] Progression has been slowed due to co-morbidities. [] Plan just implemented, too soon to assess goals progression <30days   [] Goals require adjustment due to lack of progress  [] Patient is not progressing as expected and requires additional follow up with physician  [] Other    Prognosis for POC: [x] Good [] Fair  [] Poor    Patient requires continued skilled intervention: [x] Yes  [] No        PLAN:   [x] Continue per plan of care [] Alter current plan (see comments)  [] Plan of care initiated [] Hold pending MD visit [] Discharge    Electronically signed by: Eleanor Schneider PT    Note: If patient does not return for scheduled/recommended follow up visits, this note will serve as a discharge from care along with the most recent update on progress.

## 2022-05-12 ENCOUNTER — HOSPITAL ENCOUNTER (OUTPATIENT)
Dept: PHYSICAL THERAPY | Age: 22
Setting detail: THERAPIES SERIES
Discharge: HOME | End: 2022-05-12
Payer: COMMERCIAL

## 2022-05-12 PROCEDURE — 97110 THERAPEUTIC EXERCISES: CPT

## 2022-05-12 PROCEDURE — 97140 MANUAL THERAPY 1/> REGIONS: CPT

## 2022-05-12 NOTE — PLAN OF CARE
[place] [address] [address]  Phone: [phone] Fax: [phone]     Physical Therapy Discharge Summary    Dear Dr Jonas Dakins  ,    We had the pleasure of treating the following patient for physical therapy services at [address] [place] [address]. A summary of our findings can be found in the discharge summary below. If you have any questions or concerns regarding these findings, please do not hesitate to contact me at the office phone number above.   Thank you for the referral.     Physician Signature:________________________________Date:__________________  By signing above (or electronic signature), therapists plan is approved by physician      Overall Response to Treatment:   [x]Patient is responding well to treatment and improvement is noted with regards  to goals   []Patient should continue to improve in reasonable time if they continue HEP   []Patient has plateaued and is no longer responding to skilled PT intervention    []Patient is getting worse and would benefit from return to referring MD   []Patient unable to adhere to initial POC   []Other:     Date range of Visits: 3/14/2022 thru 2022  Total Visits: 7          Physical Therapy Treatment Note/ Progress Report:     Date:  2022    Patient Name:  Seven Leblanc    :  2000  MRN: [id number]  Restrictions/Precautions:    Medical/Treatment Diagnosis Information:  · Diagnosis: concussion, post concussion HA  · Treatment Diagnosis: concussion S06.0X0A, neck pain N82.6  Insurance/Certification information:  PT Insurance Information: [employer]/[place]  Physician Information:  Referring Practitioner: Dr Efraín Acuna of care signed (Y/N):     Date of Patient follow up with Physician:      Progress Report: [x]  Yes  []  No     Date Range for reporting period:  Beginning:3/14/2022  Endin2022    Progress report due (10 Rx/or 30 days whichever is less): 1/70/8061    Recertification due (POC duration/ or 90 days whichever is less): 5/14/2022    Visit # Insurance Allowable Auth Needed   7 (17 prior in 2021) Mary Munoz 414 []Yes    [x]No     Pain level:  3/10 HA     SUBJECTIVE:  Patient reports that she felt good improvement after last session. Had a pretty good week even though she studied a lot. Overall HA and neck feeling better. Will plan to continue with some additional therapy once she returns home. Feeling about 80% improved at this time.       OBJECTIVE:    Observation:    Test measurements:  NDI 16% disability, DHI 6% disability         CERV ROM       Cervical Flexion 75     Cervical Extension 80     Cervical SB 45 45   Cervical rotation 80 84          Special Test Results Symptoms and Time until Resolution of Symptoms   Dizziness Handicap Inventory 6% disability     BERTHA                            VOMS Not Tested Headache 0-10 Dizziness 0-10 Nausea 0-10 Fogginess 0-10 Comments   Baseline Symptoms:   3-4/10 0 0 0     Smooth Pursuits   3-4/10 0 0 0    Saccades-Horizontal      3-4/10 0 0 0     Saccades-Vertical   3-4/10 0 0 0     Convergence (near Point)   0 0 0 0          RESTRICTIONS/PRECAUTIONS: none    INTERVENTIONS:      Therapeutic Ex: 15 min Sets/sec Reps Notes   UBE 1 0    T- band Row/pinch 1 15 Shaky, green + red TB for cervical retraction   T- band lower pinch 1 15 Shaky,green + red TB  For cervical retraction   Cervical rotation ball at wall 2 10 white   Upper cervical rotation stretch 30 4    Lateral taps 0  green   No money + cerv retraction at wall 0  blue   T/spine ext over roll 10 10    Quadruped w cerv retract +red TB 1 10    Supine chin tuck with rotation stretch 30 3 bilat   Chin tuck 10 10    Seated scalene stretch 30 4    Prone swiss ball row 0  3 lb, bilat- cues form   Prone swiss ball sh ext 0  3 lb, bilat, cues form   Prone swiss ball T  1 15 2 lb bilat, bilat, cues form    Prone swiss ball Y 1 15 1 lb, bilat, cues form    Touchdown at wall with kneeling on BOSU ball 0  red band   Manual Intervention 17 min      Cerv mobs/manip 1 8 cervical C2-T1   Thoracic mobs/manip 1 2    CT manip 1 2 seated   Rib mobilizations  0 L 1st rib   STM 1 5 L   Seated upper cervical mobilization 1 0 bilateral   Manual scalene stretch 30 0    NMR re-education: 0 min      Wall Postural re-ed with head rotation and ball 2 10 White ball   Smooth pursuits horizontal and vertical 2 10    Saccades horiz and vertical 2 10    Standing gaze stab with ball toss 2 10    Eye tracking with ball 2 10    Walking gaze stab with head turns and nods 2 10    Scanning environment 2 10    BIODEX- Random control 1 min  Level 8 (medium control and [place] 91%, 91%, 91%)         Therapeutic Exercise and NMR EXR  [x] (93404) Provided verbal/tactile cueing for activities related to strengthening, flexibility, endurance, ROM  for improvements in cervical, postural, scapular, scapulothoracic and UE control with self care, reaching, carrying, lifting, house/yardwork, driving/computer work. [x] (56682) Provided verbal/tactile cueing for activities related to improving balance, coordination, kinesthetic sense, posture, motor skill, proprioception  to assist with cervical, scapular, scapulothoracic and UE control with self care, reaching, carrying, lifting, house/yardwork, driving/computer work. Therapeutic Activities:    [] (97009 or 02885) Provided verbal/tactile cueing for activities related to improving balance, coordination, kinesthetic sense, posture, motor skill, proprioception and motor activation to allow for proper function of cervical, scapular, scapulothoracic and UE control with self care, carrying, lifting, driving/computer work.      Home Exercise Program:    [x] (39038) Reviewed/Progressed HEP activities related to strengthening, flexibility, endurance, ROM of cervical, scapular, scapulothoracic and UE control with self care, reaching, carrying, lifting, house/yardwork, driving/computer work  [] (91057) Reviewed/Progressed HEP activities related to improving balance, coordination, kinesthetic sense, posture, motor skill, proprioception of cervical, scapular, scapulothoracic and UE control with self care, reaching, carrying, lifting, house/yardwork, driving/computer work      Manual Treatments:  PROM / STM / Oscillations-Mobs:  G-I, II, III, IV (PA's, Inf., Post.)  [x] (15434) Provided manual therapy to mobilize soft tissue/joints of cervical/CT, scapular GHJ and UE for the purpose of decreasing headache, modulating pain, promoting relaxation,  increasing ROM, reducing/eliminating soft tissue swelling/inflammation/restriction, improving soft tissue extensibility and allowing for proper ROM for normal function with self care, reaching, carrying, lifting, house/yardwork, driving/computer work    Modalities:      Charges:  Timed Code Treatment Minutes: 33   Total Treatment Minutes: 35     [] EVAL (LOW) 51044 (typically 20 minutes face-to-face)  [] EVAL (MOD) 81883 (typically 30 minutes face-to-face)  [] EVAL (HIGH) 65203 (typically 45 minutes face-to-face)  [] RE-EVAL     [x] GP(98094) x   1  [] Dry needle 1 or 2 Muscles (18872)  [] NMR (95167) x     [] Dry needle 3+ Muscles (94270)  [x] Manual (75152) x 1      [] TA (34845) x     [] Mech Traction (58943)  [] ES(attended) (90953)     [] ES (un) (94231):   [] VASO (25473)  [] Other:      If [facility] Please Indicate Time In/Out  CPT Code Time in Time out                                     GOALS: (updated goals)  Patient stated goal: decrease HA  [x] Progressing: [] Met: [] Not Met: [] Adjusted    Therapist goals for Patient:   Short Term Goals: To be achieved in: 2 weeks  1. Independent in HEP and progression per patient tolerance, in order to prevent re-injury. [] Progressing: [x] Met: [] Not Met: [] Adjusted  2.  Patient will have a decrease in pain and concussion symptoms to facilitate improvement in movement, function, and ADLs as indicated by Functional Deficits. [] Progressing: [x] Met: [] Not Met: [] Adjusted    Long Term Goals: To be achieved in: 8 weeks  1. Disability index score of 10% or less for the NDI to assist with reaching prior level of function. [x] Progressing: [] Met: [] Not Met: [] Adjusted  2. Patient will demonstrate increased AROM to [facility] of cervical/thoracic spine to allow for proper joint functioning as indicated by patients Functional Deficits. [] Progressing: [x] Met: [] Not Met: [] Adjusted  3. Patient will demonstrate an increase in postural awareness and control and activation of  Deep cervical stabilizers to allow for proper functional mobility as indicated by patients Functional Deficits. [] Progressing: [x] Met: [] Not Met: [] Adjusted  4. Patient to have normalized concussion symptoms to facilitate participation in movement, function, ADLs. [] Progressing: [x] Met: [] Not Met: [] Adjusted   5. Patient will report being able to return to daily activities to include school work and working out without increased symptoms or restriction. (patient specific functional goal)    [] Progressing: [x] Met: [] Not Met: [] Adjusted     ASSESSMENT:  Patient has been seen for 7 visits of physical therapy to address neck pain and concussion. Patient with good improvement in cervical ROM and overall joint mobility. Good improvement in all oculomotor function. Patient still with occasional HA, likely still contributed from cervical spine vs concussion. Patient with good improvement in [facility] and [address] [place] [address] since return to PT. Patient with good understanding of HEP. Will dc from PT services at this time. Patient plans to continue with PT services once she returns home.        Treatment/Activity Tolerance:  [x] Patient tolerated treatment well [] Patient limited by fatique  [] Patient limited by pain  [] Patient limited by other medical complications  [] Other:     Overall Progression Towards Functional goals/ Treatment Progress Update:  [x] Patient is progressing as expected towards functional goals listed. [] Progression is slowed due to complexities/Impairments listed. [] Progression has been slowed due to co-morbidities. [] Plan just implemented, too soon to assess goals progression <30days   [] Goals require adjustment due to lack of progress  [] Patient is not progressing as expected and requires additional follow up with physician  [] Other    Prognosis for POC: [x] Good [] Fair  [] Poor    Patient requires continued skilled intervention: [x] Yes  [] No        PLAN:   [x] Continue per plan of care [] Alter current plan (see comments)  [] Plan of care initiated [] Hold pending MD visit [] Discharge    Electronically signed by: Lulú Myers PT    Note: If patient does not return for scheduled/recommended follow up visits, this note will serve as a discharge from care along with the most recent update on progress.